# Patient Record
Sex: MALE | Race: WHITE | NOT HISPANIC OR LATINO | Employment: FULL TIME | URBAN - METROPOLITAN AREA
[De-identification: names, ages, dates, MRNs, and addresses within clinical notes are randomized per-mention and may not be internally consistent; named-entity substitution may affect disease eponyms.]

---

## 2017-01-09 ENCOUNTER — CONVERSION ENCOUNTER (OUTPATIENT)
Dept: OTHER | Facility: HOSPITAL | Age: 47
End: 2017-01-09

## 2017-01-17 ENCOUNTER — HOSPITAL ENCOUNTER (OUTPATIENT)
Dept: LAB | Facility: HOSPITAL | Age: 47
Discharge: HOME | End: 2017-01-17
Attending: FAMILY MEDICINE | Admitting: FAMILY MEDICINE

## 2017-01-17 LAB
ALBUMIN SERPL-MCNC: 4.4 G/DL (ref 3.5–4.8)
ALBUMIN/GLOB SERPL: 1.3 {RATIO} (ref 1–1.7)
ALP SERPL-CCNC: 57 IU/L (ref 32–91)
ALT SERPL-CCNC: 39 IU/L (ref 17–63)
ANION GAP SERPL CALC-SCNC: 13.2 MMOL/L (ref 10–20)
AST SERPL-CCNC: 33 IU/L (ref 15–41)
BILIRUB SERPL-MCNC: 0.8 MG/DL (ref 0.3–1.2)
BUN SERPL-MCNC: 16 MG/DL (ref 8–20)
BUN/CREAT SERPL: 17.8 (ref 6.2–20.3)
CALCIUM SERPL-MCNC: 9.6 MG/DL (ref 8.9–10.3)
CHLORIDE SERPL-SCNC: 105 MMOL/L (ref 101–111)
CHOLEST SERPL-MCNC: 174 MG/DL
CHOLEST/HDLC SERPL: 4.5 {RATIO}
CONV CO2: 25 MMOL/L (ref 22–32)
CONV LDL CHOLESTEROL DIRECT: 113 MG/DL (ref 0–100)
CONV TOTAL PROTEIN: 7.8 G/DL (ref 6.1–7.9)
CREAT UR-MCNC: 0.9 MG/DL (ref 0.7–1.2)
GLOBULIN UR ELPH-MCNC: 3.4 G/DL (ref 2.5–3.8)
GLUCOSE SERPL-MCNC: 100 MG/DL (ref 65–99)
HDLC SERPL-MCNC: 39 MG/DL
LDLC/HDLC SERPL: 2.9 {RATIO}
LIPID INTERPRETATION: ABNORMAL
POTASSIUM SERPL-SCNC: 4.2 MMOL/L (ref 3.6–5.1)
SODIUM SERPL-SCNC: 139 MMOL/L (ref 136–144)
TRIGL SERPL-MCNC: 184 MG/DL
VLDLC SERPL CALC-MCNC: 21.9 MG/DL

## 2017-08-29 ENCOUNTER — ON CAMPUS - OUTPATIENT (AMBULATORY)
Dept: URBAN - METROPOLITAN AREA HOSPITAL 2 | Facility: HOSPITAL | Age: 47
End: 2017-08-29
Payer: COMMERCIAL

## 2017-08-29 VITALS
RESPIRATION RATE: 16 BRPM | DIASTOLIC BLOOD PRESSURE: 81 MMHG | HEART RATE: 103 BPM | TEMPERATURE: 98.3 F | DIASTOLIC BLOOD PRESSURE: 99 MMHG | SYSTOLIC BLOOD PRESSURE: 133 MMHG | HEIGHT: 70 IN | HEART RATE: 93 BPM | OXYGEN SATURATION: 98 % | WEIGHT: 248 LBS | SYSTOLIC BLOOD PRESSURE: 136 MMHG | OXYGEN SATURATION: 96 % | SYSTOLIC BLOOD PRESSURE: 109 MMHG | SYSTOLIC BLOOD PRESSURE: 125 MMHG | OXYGEN SATURATION: 97 % | DIASTOLIC BLOOD PRESSURE: 87 MMHG | OXYGEN SATURATION: 95 % | RESPIRATION RATE: 20 BRPM | SYSTOLIC BLOOD PRESSURE: 142 MMHG | HEART RATE: 110 BPM | DIASTOLIC BLOOD PRESSURE: 86 MMHG | DIASTOLIC BLOOD PRESSURE: 79 MMHG | OXYGEN SATURATION: 92 % | DIASTOLIC BLOOD PRESSURE: 64 MMHG | HEART RATE: 107 BPM | HEART RATE: 115 BPM | SYSTOLIC BLOOD PRESSURE: 119 MMHG | SYSTOLIC BLOOD PRESSURE: 150 MMHG | RESPIRATION RATE: 17 BRPM

## 2017-08-29 DIAGNOSIS — Z86.010 PERSONAL HISTORY OF COLONIC POLYPS: ICD-10-CM

## 2017-08-29 DIAGNOSIS — Z80.0 FAMILY HISTORY OF MALIGNANT NEOPLASM OF DIGESTIVE ORGANS: ICD-10-CM

## 2017-08-29 PROCEDURE — 45378 DIAGNOSTIC COLONOSCOPY: CPT

## 2017-08-29 RX ADMIN — PROPOFOL: 10 INJECTION, EMULSION INTRAVENOUS at 11:15

## 2017-09-26 ENCOUNTER — OFFICE VISIT (OUTPATIENT)
Dept: NEUROSURGERY | Facility: CLINIC | Age: 47
End: 2017-09-26

## 2017-09-26 VITALS
WEIGHT: 250 LBS | DIASTOLIC BLOOD PRESSURE: 97 MMHG | SYSTOLIC BLOOD PRESSURE: 147 MMHG | HEART RATE: 81 BPM | HEIGHT: 71 IN | BODY MASS INDEX: 35 KG/M2

## 2017-09-26 DIAGNOSIS — M51.04 HERNIATED NUCLEUS PULPOSUS WITH MYELOPATHY, THORACIC: Primary | ICD-10-CM

## 2017-09-26 PROCEDURE — 99205 OFFICE O/P NEW HI 60 MIN: CPT | Performed by: NEUROLOGICAL SURGERY

## 2017-09-26 RX ORDER — CEFAZOLIN SODIUM 2 G/100ML
2 INJECTION, SOLUTION INTRAVENOUS ONCE
Status: CANCELLED | OUTPATIENT
Start: 2017-10-02 | End: 2017-09-26

## 2017-09-26 RX ORDER — LATANOPROST 50 UG/ML
1 SOLUTION/ DROPS OPHTHALMIC NIGHTLY
COMMUNITY

## 2017-09-26 RX ORDER — ESCITALOPRAM OXALATE 10 MG/1
10 TABLET ORAL NIGHTLY
COMMUNITY

## 2017-09-26 RX ORDER — TRAMADOL HYDROCHLORIDE 50 MG/1
TABLET ORAL
Refills: 1 | COMMUNITY
Start: 2017-08-09 | End: 2017-09-27

## 2017-09-26 RX ORDER — MONTELUKAST SODIUM 10 MG/1
10 TABLET ORAL NIGHTLY
COMMUNITY

## 2017-09-26 NOTE — PATIENT INSTRUCTIONS

## 2017-09-26 NOTE — PROGRESS NOTES
Subjective   Patient ID: Cuba Mosquera is a 47 y.o. male is here today as a self referral for back pain with numbness and tingling in bilateral lower extremity's.    History of Present Illness    This patient has been having trouble with his back and his legs since the end of May.  He initially had a lot of pain in his back and some pain radiating down his right leg but this has gradually gone away.  It has been replaced with weakness and numbness in his right leg.  This has occurred beginning in June and gotten steadily worse.  He feels like his left leg is overall okay.  He has no difficulty with bowel and bladder control or other associated symptoms.  Nothing specific brought the symptoms on or make them better.    The following portions of the patient's history were reviewed and updated as appropriate: allergies, current medications, past family history, past medical history, past social history, past surgical history and problem list.    Review of Systems   Constitutional: Positive for activity change.   Respiratory: Positive for apnea. Negative for chest tightness and shortness of breath.    Cardiovascular: Negative for chest pain.   Musculoskeletal: Positive for back pain and gait problem.        Bilateral leg pain   Neurological: Positive for weakness and numbness.        Tingling in bilateral lower extremity's   All other systems reviewed and are negative.      Objective   Physical Exam   Constitutional: He is oriented to person, place, and time. He appears well-developed and well-nourished.   HENT:   Head: Normocephalic and atraumatic.   Eyes: Conjunctivae and EOM are normal. Pupils are equal, round, and reactive to light.   Fundoscopic exam:       The right eye shows no papilledema. The right eye shows venous pulsations.        The left eye shows no papilledema. The left eye shows venous pulsations.   Neck: Carotid bruit is not present.   Neurological: He is oriented to person, place, and time. He has a  normal Finger-Nose-Finger Test and a normal Heel to Shin Test. Gait normal.   Reflex Scores:       Tricep reflexes are 2+ on the right side and 2+ on the left side.       Bicep reflexes are 2+ on the right side and 2+ on the left side.       Brachioradialis reflexes are 2+ on the right side and 2+ on the left side.       Patellar reflexes are 2+ on the right side and 3+ on the left side.       Achilles reflexes are 2+ on the right side and 3+ on the left side.  Psychiatric: His speech is normal.     Neurologic Exam     Mental Status   Oriented to person, place, and time.   Registration of memory: Good recent and remote memory.   Attention: normal. Concentration: normal.   Speech: speech is normal   Level of consciousness: alert  Knowledge: consistent with education.     Cranial Nerves     CN II   Visual fields full to confrontation.   Visual acuity: normal    CN III, IV, VI   Pupils are equal, round, and reactive to light.  Extraocular motions are normal.     CN V   Facial sensation intact.   Right corneal reflex: normal  Left corneal reflex: normal    CN VII   Facial expression full, symmetric.   Right facial weakness: none  Left facial weakness: none    CN VIII   Hearing: intact    CN IX, X   Palate: symmetric    CN XI   Right sternocleidomastoid strength: normal  Left sternocleidomastoid strength: normal    CN XII   Tongue: not atrophic  Tongue deviation: none    Motor Exam   Muscle bulk: normal  Right arm tone: normal  Left arm tone: normal  Right leg tone: normal  Left leg tone: normal    Strength   Strength 5/5 except as noted.   Right anterior tibial: 4/5  Right posterior tibial: 4/5  Right peroneal: 4/5    Sensory Exam   Light touch normal.   Sensory deficit distribution on right: L3    Gait, Coordination, and Reflexes     Gait  Gait: normal    Coordination   Finger to nose coordination: normal  Heel to shin coordination: normal    Reflexes   Right brachioradialis: 2+  Left brachioradialis: 2+  Right biceps:  2+  Left biceps: 2+  Right triceps: 2+  Left triceps: 2+  Right patellar: 2+  Left patellar: 3+  Right achilles: 2+  Left achilles: 3+  Right : 2+  Left : 2+  Left plantar: upgoing      Assessment/Plan   Independent Review of Radiographic Studies:      I reviewed his MRI of the cervical, thoracic, and lumbar spine.  The MRI of the cervical spine show some disc bulging at several levels and some stenosis but not severe.  The MRI of the thoracic spine shows multilevel disc bulging but by far and away the most severe level is T8-T9 where there is a large left-sided disc herniation compressing the spinal cord and changing the spinal cord signal.  The lumbar spine for the most part looks okay to me.    Medical Decision Making:      I told the patient and his wife about the imaging.  I told them that I see little option at this point but to proceed with a thoracic discectomy at T8-T9.  This will require a transpedicular approach to safely get it out.  I recommended using minimally invasive instrumentation to try and minimize the pain he has postoperatively.  I told him that the surgery would carry a about a 5% chance of causing him paralysis as well as a 2 or 3% chance of infection, bleeding, CSF leak, anesthetic risk and other complications.  The biggest risk is that he does not get very much improvement.  I explain why this is in the face of spinal cord damage.  I also told him about a myelogram but I did not recommend doing one right now as I think it is too risky.  We discussed the postoperative hospital and home course.  He does ask to proceed.    He will need to be scheduled for a: Left T8 9 laminectomy and discectomy with Metrix    Cuba was seen today for back pain and numbness.    Diagnoses and all orders for this visit:    Herniated nucleus pulposus with myelopathy, thoracic  -     Case Request; Standing  -     ceFAZolin (ANCEF) 2 g in sodium chloride 0.9 % 100 mL IVPB; Infuse 2 g into a venous catheter  1 (One) Time.  -     XR Lumbar Spine 2-3 Views; Future  -     XR Spine Thoracic 2 View; Future  -     Case Request    Other orders  -     Follow anesthesia standing orders.  -     Obtain informed consent  -     Follow anesthesia standing orders.; Standing  -     AMOS hose- To be placed on patient in pre-op; Standing  -     SCD (sequential compression device)- to be placed on patient in Pre-op; Standing    Return for After radiology test.

## 2017-09-27 ENCOUNTER — APPOINTMENT (OUTPATIENT)
Dept: PREADMISSION TESTING | Facility: HOSPITAL | Age: 47
End: 2017-09-27

## 2017-09-27 ENCOUNTER — HOSPITAL ENCOUNTER (OUTPATIENT)
Dept: GENERAL RADIOLOGY | Facility: HOSPITAL | Age: 47
Discharge: HOME | End: 2017-09-27
Admitting: NEUROLOGICAL SURGERY

## 2017-09-27 ENCOUNTER — HOSPITAL ENCOUNTER (OUTPATIENT)
Dept: GENERAL RADIOLOGY | Facility: HOSPITAL | Age: 47
Discharge: HOME | End: 2017-09-27

## 2017-09-27 VITALS
SYSTOLIC BLOOD PRESSURE: 152 MMHG | OXYGEN SATURATION: 97 % | DIASTOLIC BLOOD PRESSURE: 90 MMHG | RESPIRATION RATE: 16 BRPM | TEMPERATURE: 97 F | HEART RATE: 94 BPM | WEIGHT: 255 LBS | BODY MASS INDEX: 35.7 KG/M2 | HEIGHT: 71 IN

## 2017-09-27 DIAGNOSIS — M51.04 HERNIATED NUCLEUS PULPOSUS WITH MYELOPATHY, THORACIC: ICD-10-CM

## 2017-09-27 LAB
ANION GAP SERPL CALCULATED.3IONS-SCNC: 13.1 MMOL/L
BUN BLD-MCNC: 13 MG/DL (ref 6–20)
BUN/CREAT SERPL: 12.1 (ref 7–25)
CALCIUM SPEC-SCNC: 9.8 MG/DL (ref 8.6–10.5)
CHLORIDE SERPL-SCNC: 103 MMOL/L (ref 98–107)
CO2 SERPL-SCNC: 24.9 MMOL/L (ref 22–29)
CREAT BLD-MCNC: 1.07 MG/DL (ref 0.76–1.27)
DEPRECATED RDW RBC AUTO: 42.2 FL (ref 37–54)
ERYTHROCYTE [DISTWIDTH] IN BLOOD BY AUTOMATED COUNT: 12.5 % (ref 11.5–14.5)
GFR SERPL CREATININE-BSD FRML MDRD: 74 ML/MIN/1.73
GLUCOSE BLD-MCNC: 119 MG/DL (ref 65–99)
HCT VFR BLD AUTO: 43.8 % (ref 40.4–52.2)
HGB BLD-MCNC: 14.7 G/DL (ref 13.7–17.6)
MCH RBC QN AUTO: 30.9 PG (ref 27–32.7)
MCHC RBC AUTO-ENTMCNC: 33.6 G/DL (ref 32.6–36.4)
MCV RBC AUTO: 92 FL (ref 79.8–96.2)
PLATELET # BLD AUTO: 252 10*3/MM3 (ref 140–500)
PMV BLD AUTO: 10.1 FL (ref 6–12)
POTASSIUM BLD-SCNC: 4 MMOL/L (ref 3.5–5.2)
RBC # BLD AUTO: 4.76 10*6/MM3 (ref 4.6–6)
SODIUM BLD-SCNC: 141 MMOL/L (ref 136–145)
WBC NRBC COR # BLD: 7.25 10*3/MM3 (ref 4.5–10.7)

## 2017-09-27 PROCEDURE — 72100 X-RAY EXAM L-S SPINE 2/3 VWS: CPT

## 2017-09-27 PROCEDURE — 85027 COMPLETE CBC AUTOMATED: CPT | Performed by: NEUROLOGICAL SURGERY

## 2017-09-27 PROCEDURE — 36415 COLL VENOUS BLD VENIPUNCTURE: CPT

## 2017-09-27 PROCEDURE — 80048 BASIC METABOLIC PNL TOTAL CA: CPT | Performed by: NEUROLOGICAL SURGERY

## 2017-09-27 PROCEDURE — 93005 ELECTROCARDIOGRAM TRACING: CPT

## 2017-09-27 PROCEDURE — 72070 X-RAY EXAM THORAC SPINE 2VWS: CPT

## 2017-09-27 PROCEDURE — 93010 ELECTROCARDIOGRAM REPORT: CPT | Performed by: INTERNAL MEDICINE

## 2017-09-27 RX ORDER — TRAMADOL HYDROCHLORIDE 50 MG/1
50 TABLET ORAL EVERY 6 HOURS PRN
COMMUNITY
End: 2017-10-04 | Stop reason: HOSPADM

## 2017-09-27 RX ORDER — LATANOPROST 50 UG/ML
1 SOLUTION/ DROPS OPHTHALMIC NIGHTLY
Status: ON HOLD | COMMUNITY
End: 2017-10-02 | Stop reason: SDUPTHER

## 2017-09-28 ENCOUNTER — HOSPITAL ENCOUNTER (OUTPATIENT)
Dept: GENERAL RADIOLOGY | Facility: HOSPITAL | Age: 47
Discharge: HOME | End: 2017-09-28
Attending: NEUROLOGICAL SURGERY | Admitting: NEUROLOGICAL SURGERY

## 2017-09-28 DIAGNOSIS — Z01.818 PRE-OP EVALUATION: ICD-10-CM

## 2017-09-28 DIAGNOSIS — Z01.818 PRE-OP EVALUATION: Primary | ICD-10-CM

## 2017-09-28 PROCEDURE — 71020 HC CHEST PA AND LATERAL: CPT

## 2017-10-02 ENCOUNTER — HOSPITAL ENCOUNTER (OUTPATIENT)
Facility: HOSPITAL | Age: 47
Setting detail: OBSERVATION
Discharge: HOME | End: 2017-10-04
Attending: NEUROLOGICAL SURGERY | Admitting: NEUROLOGICAL SURGERY

## 2017-10-02 ENCOUNTER — APPOINTMENT (OUTPATIENT)
Dept: GENERAL RADIOLOGY | Facility: HOSPITAL | Age: 47
End: 2017-10-02

## 2017-10-02 ENCOUNTER — ANESTHESIA (OUTPATIENT)
Dept: PERIOP | Facility: HOSPITAL | Age: 47
End: 2017-10-02

## 2017-10-02 ENCOUNTER — ANESTHESIA EVENT (OUTPATIENT)
Dept: PERIOP | Facility: HOSPITAL | Age: 47
End: 2017-10-02

## 2017-10-02 DIAGNOSIS — M51.04 HERNIATED NUCLEUS PULPOSUS WITH MYELOPATHY, THORACIC: ICD-10-CM

## 2017-10-02 DIAGNOSIS — R26.2 DIFFICULTY WALKING: Primary | ICD-10-CM

## 2017-10-02 PROCEDURE — 76000 FLUOROSCOPY <1 HR PHYS/QHP: CPT

## 2017-10-02 PROCEDURE — 25010000003 CEFAZOLIN PER 500 MG: Performed by: NEUROLOGICAL SURGERY

## 2017-10-02 PROCEDURE — G0378 HOSPITAL OBSERVATION PER HR: HCPCS

## 2017-10-02 PROCEDURE — 25010000002 SUCCINYLCHOLINE PER 20 MG: Performed by: ANESTHESIOLOGY

## 2017-10-02 PROCEDURE — 72070 X-RAY EXAM THORAC SPINE 2VWS: CPT

## 2017-10-02 PROCEDURE — 25010000002 FENTANYL CITRATE (PF) 100 MCG/2ML SOLUTION: Performed by: ANESTHESIOLOGY

## 2017-10-02 PROCEDURE — 25010000002 MIDAZOLAM PER 1 MG: Performed by: ANESTHESIOLOGY

## 2017-10-02 PROCEDURE — 94799 UNLISTED PULMONARY SVC/PX: CPT

## 2017-10-02 PROCEDURE — 63055 DECOMPRESS SPINAL CORD THRC: CPT | Performed by: NEUROLOGICAL SURGERY

## 2017-10-02 PROCEDURE — 25810000003 SODIUM CHLORIDE 0.9 % WITH KCL 20 MEQ 20-0.9 MEQ/L-% SOLUTION: Performed by: NEUROLOGICAL SURGERY

## 2017-10-02 PROCEDURE — 25010000003 CEFAZOLIN IN DEXTROSE 2-4 GM/100ML-% SOLUTION: Performed by: NEUROLOGICAL SURGERY

## 2017-10-02 PROCEDURE — 25010000002 PROPOFOL 10 MG/ML EMULSION: Performed by: ANESTHESIOLOGY

## 2017-10-02 PROCEDURE — 25010000002 MORPHINE PER 10 MG: Performed by: NEUROLOGICAL SURGERY

## 2017-10-02 PROCEDURE — 25010000002 HYDROMORPHONE PER 4 MG: Performed by: ANESTHESIOLOGY

## 2017-10-02 PROCEDURE — 25010000002 KETOROLAC TROMETHAMINE PER 15 MG: Performed by: ANESTHESIOLOGY

## 2017-10-02 PROCEDURE — 25010000002 ONDANSETRON PER 1 MG: Performed by: ANESTHESIOLOGY

## 2017-10-02 PROCEDURE — 25010000002 DEXAMETHASONE PER 1 MG: Performed by: ANESTHESIOLOGY

## 2017-10-02 PROCEDURE — 25010000002 NEOSTIGMINE PER 0.5 MG: Performed by: ANESTHESIOLOGY

## 2017-10-02 RX ORDER — MORPHINE SULFATE 2 MG/ML
2 INJECTION, SOLUTION INTRAMUSCULAR; INTRAVENOUS EVERY 4 HOURS PRN
Status: DISCONTINUED | OUTPATIENT
Start: 2017-10-02 | End: 2017-10-03

## 2017-10-02 RX ORDER — NALOXONE HCL 0.4 MG/ML
0.2 VIAL (ML) INJECTION AS NEEDED
Status: DISCONTINUED | OUTPATIENT
Start: 2017-10-02 | End: 2017-10-02 | Stop reason: HOSPADM

## 2017-10-02 RX ORDER — MIDAZOLAM HYDROCHLORIDE 1 MG/ML
1 INJECTION INTRAMUSCULAR; INTRAVENOUS
Status: DISCONTINUED | OUTPATIENT
Start: 2017-10-02 | End: 2017-10-02 | Stop reason: HOSPADM

## 2017-10-02 RX ORDER — SODIUM CHLORIDE 0.9 % (FLUSH) 0.9 %
1-10 SYRINGE (ML) INJECTION AS NEEDED
Status: DISCONTINUED | OUTPATIENT
Start: 2017-10-02 | End: 2017-10-04 | Stop reason: HOSPADM

## 2017-10-02 RX ORDER — EPHEDRINE SULFATE 50 MG/ML
5 INJECTION, SOLUTION INTRAVENOUS ONCE AS NEEDED
Status: DISCONTINUED | OUTPATIENT
Start: 2017-10-02 | End: 2017-10-02 | Stop reason: HOSPADM

## 2017-10-02 RX ORDER — NALOXONE HCL 0.4 MG/ML
0.4 VIAL (ML) INJECTION
Status: DISCONTINUED | OUTPATIENT
Start: 2017-10-02 | End: 2017-10-03

## 2017-10-02 RX ORDER — SODIUM CHLORIDE 0.9 % (FLUSH) 0.9 %
1-10 SYRINGE (ML) INJECTION AS NEEDED
Status: DISCONTINUED | OUTPATIENT
Start: 2017-10-02 | End: 2017-10-02 | Stop reason: HOSPADM

## 2017-10-02 RX ORDER — FLUMAZENIL 0.1 MG/ML
0.2 INJECTION INTRAVENOUS AS NEEDED
Status: DISCONTINUED | OUTPATIENT
Start: 2017-10-02 | End: 2017-10-02 | Stop reason: HOSPADM

## 2017-10-02 RX ORDER — DIPHENHYDRAMINE HYDROCHLORIDE 50 MG/ML
12.5 INJECTION INTRAMUSCULAR; INTRAVENOUS
Status: DISCONTINUED | OUTPATIENT
Start: 2017-10-02 | End: 2017-10-02 | Stop reason: HOSPADM

## 2017-10-02 RX ORDER — ESCITALOPRAM OXALATE 10 MG/1
10 TABLET ORAL NIGHTLY
Status: DISCONTINUED | OUTPATIENT
Start: 2017-10-02 | End: 2017-10-04 | Stop reason: HOSPADM

## 2017-10-02 RX ORDER — WOUND DRESSING ADHESIVE - LIQUID
LIQUID MISCELLANEOUS AS NEEDED
Status: DISCONTINUED | OUTPATIENT
Start: 2017-10-02 | End: 2017-10-02 | Stop reason: HOSPADM

## 2017-10-02 RX ORDER — ONDANSETRON 2 MG/ML
INJECTION INTRAMUSCULAR; INTRAVENOUS AS NEEDED
Status: DISCONTINUED | OUTPATIENT
Start: 2017-10-02 | End: 2017-10-02 | Stop reason: SURG

## 2017-10-02 RX ORDER — SUCCINYLCHOLINE CHLORIDE 20 MG/ML
INJECTION INTRAMUSCULAR; INTRAVENOUS AS NEEDED
Status: DISCONTINUED | OUTPATIENT
Start: 2017-10-02 | End: 2017-10-02 | Stop reason: SURG

## 2017-10-02 RX ORDER — HYDROMORPHONE HYDROCHLORIDE 1 MG/ML
0.5 INJECTION, SOLUTION INTRAMUSCULAR; INTRAVENOUS; SUBCUTANEOUS
Status: DISCONTINUED | OUTPATIENT
Start: 2017-10-02 | End: 2017-10-02 | Stop reason: HOSPADM

## 2017-10-02 RX ORDER — FENTANYL CITRATE 50 UG/ML
INJECTION, SOLUTION INTRAMUSCULAR; INTRAVENOUS AS NEEDED
Status: DISCONTINUED | OUTPATIENT
Start: 2017-10-02 | End: 2017-10-02 | Stop reason: SURG

## 2017-10-02 RX ORDER — PROMETHAZINE HYDROCHLORIDE 25 MG/ML
12.5 INJECTION, SOLUTION INTRAMUSCULAR; INTRAVENOUS ONCE AS NEEDED
Status: DISCONTINUED | OUTPATIENT
Start: 2017-10-02 | End: 2017-10-02 | Stop reason: HOSPADM

## 2017-10-02 RX ORDER — FENTANYL CITRATE 50 UG/ML
50 INJECTION, SOLUTION INTRAMUSCULAR; INTRAVENOUS
Status: DISCONTINUED | OUTPATIENT
Start: 2017-10-02 | End: 2017-10-02 | Stop reason: HOSPADM

## 2017-10-02 RX ORDER — ROCURONIUM BROMIDE 10 MG/ML
INJECTION, SOLUTION INTRAVENOUS AS NEEDED
Status: DISCONTINUED | OUTPATIENT
Start: 2017-10-02 | End: 2017-10-02 | Stop reason: SURG

## 2017-10-02 RX ORDER — LABETALOL HYDROCHLORIDE 5 MG/ML
5 INJECTION, SOLUTION INTRAVENOUS
Status: DISCONTINUED | OUTPATIENT
Start: 2017-10-02 | End: 2017-10-02 | Stop reason: HOSPADM

## 2017-10-02 RX ORDER — HYDROCODONE BITARTRATE AND ACETAMINOPHEN 7.5; 325 MG/1; MG/1
1 TABLET ORAL ONCE AS NEEDED
Status: DISCONTINUED | OUTPATIENT
Start: 2017-10-02 | End: 2017-10-02 | Stop reason: HOSPADM

## 2017-10-02 RX ORDER — LIDOCAINE HYDROCHLORIDE 20 MG/ML
INJECTION, SOLUTION INFILTRATION; PERINEURAL AS NEEDED
Status: DISCONTINUED | OUTPATIENT
Start: 2017-10-02 | End: 2017-10-02 | Stop reason: SURG

## 2017-10-02 RX ORDER — PROMETHAZINE HYDROCHLORIDE 25 MG/1
25 TABLET ORAL ONCE AS NEEDED
Status: DISCONTINUED | OUTPATIENT
Start: 2017-10-02 | End: 2017-10-02 | Stop reason: HOSPADM

## 2017-10-02 RX ORDER — CEFAZOLIN SODIUM 2 G/100ML
2 INJECTION, SOLUTION INTRAVENOUS EVERY 8 HOURS
Status: COMPLETED | OUTPATIENT
Start: 2017-10-02 | End: 2017-10-04

## 2017-10-02 RX ORDER — SODIUM CHLORIDE AND POTASSIUM CHLORIDE 150; 900 MG/100ML; MG/100ML
100 INJECTION, SOLUTION INTRAVENOUS CONTINUOUS
Status: DISCONTINUED | OUTPATIENT
Start: 2017-10-02 | End: 2017-10-03

## 2017-10-02 RX ORDER — KETOROLAC TROMETHAMINE 30 MG/ML
INJECTION, SOLUTION INTRAMUSCULAR; INTRAVENOUS AS NEEDED
Status: DISCONTINUED | OUTPATIENT
Start: 2017-10-02 | End: 2017-10-02 | Stop reason: SURG

## 2017-10-02 RX ORDER — GLYCOPYRROLATE 0.2 MG/ML
INJECTION INTRAMUSCULAR; INTRAVENOUS AS NEEDED
Status: DISCONTINUED | OUTPATIENT
Start: 2017-10-02 | End: 2017-10-02 | Stop reason: SURG

## 2017-10-02 RX ORDER — ONDANSETRON 2 MG/ML
4 INJECTION INTRAMUSCULAR; INTRAVENOUS ONCE AS NEEDED
Status: DISCONTINUED | OUTPATIENT
Start: 2017-10-02 | End: 2017-10-02 | Stop reason: HOSPADM

## 2017-10-02 RX ORDER — HYDRALAZINE HYDROCHLORIDE 20 MG/ML
5 INJECTION INTRAMUSCULAR; INTRAVENOUS
Status: DISCONTINUED | OUTPATIENT
Start: 2017-10-02 | End: 2017-10-02 | Stop reason: HOSPADM

## 2017-10-02 RX ORDER — DEXAMETHASONE SODIUM PHOSPHATE 10 MG/ML
INJECTION INTRAMUSCULAR; INTRAVENOUS AS NEEDED
Status: DISCONTINUED | OUTPATIENT
Start: 2017-10-02 | End: 2017-10-02 | Stop reason: SURG

## 2017-10-02 RX ORDER — SODIUM CHLORIDE, SODIUM LACTATE, POTASSIUM CHLORIDE, CALCIUM CHLORIDE 600; 310; 30; 20 MG/100ML; MG/100ML; MG/100ML; MG/100ML
9 INJECTION, SOLUTION INTRAVENOUS CONTINUOUS
Status: DISCONTINUED | OUTPATIENT
Start: 2017-10-02 | End: 2017-10-02

## 2017-10-02 RX ORDER — PROMETHAZINE HYDROCHLORIDE 25 MG/1
12.5 TABLET ORAL ONCE AS NEEDED
Status: DISCONTINUED | OUTPATIENT
Start: 2017-10-02 | End: 2017-10-02 | Stop reason: HOSPADM

## 2017-10-02 RX ORDER — OXYCODONE AND ACETAMINOPHEN 7.5; 325 MG/1; MG/1
1 TABLET ORAL ONCE AS NEEDED
Status: DISCONTINUED | OUTPATIENT
Start: 2017-10-02 | End: 2017-10-02 | Stop reason: HOSPADM

## 2017-10-02 RX ORDER — FAMOTIDINE 10 MG/ML
20 INJECTION, SOLUTION INTRAVENOUS ONCE
Status: COMPLETED | OUTPATIENT
Start: 2017-10-02 | End: 2017-10-02

## 2017-10-02 RX ORDER — HYDROMORPHONE HCL 110MG/55ML
PATIENT CONTROLLED ANALGESIA SYRINGE INTRAVENOUS AS NEEDED
Status: DISCONTINUED | OUTPATIENT
Start: 2017-10-02 | End: 2017-10-02 | Stop reason: SURG

## 2017-10-02 RX ORDER — ONDANSETRON 4 MG/1
4 TABLET, ORALLY DISINTEGRATING ORAL EVERY 6 HOURS PRN
Status: DISCONTINUED | OUTPATIENT
Start: 2017-10-02 | End: 2017-10-04 | Stop reason: HOSPADM

## 2017-10-02 RX ORDER — ONDANSETRON 4 MG/1
4 TABLET, FILM COATED ORAL EVERY 6 HOURS PRN
Status: DISCONTINUED | OUTPATIENT
Start: 2017-10-02 | End: 2017-10-04 | Stop reason: HOSPADM

## 2017-10-02 RX ORDER — ONDANSETRON 2 MG/ML
4 INJECTION INTRAMUSCULAR; INTRAVENOUS EVERY 6 HOURS PRN
Status: DISCONTINUED | OUTPATIENT
Start: 2017-10-02 | End: 2017-10-04 | Stop reason: HOSPADM

## 2017-10-02 RX ORDER — PROPOFOL 10 MG/ML
VIAL (ML) INTRAVENOUS AS NEEDED
Status: DISCONTINUED | OUTPATIENT
Start: 2017-10-02 | End: 2017-10-02 | Stop reason: SURG

## 2017-10-02 RX ORDER — CEFAZOLIN SODIUM 2 G/100ML
2 INJECTION, SOLUTION INTRAVENOUS ONCE
Status: COMPLETED | OUTPATIENT
Start: 2017-10-02 | End: 2017-10-02

## 2017-10-02 RX ORDER — PROMETHAZINE HYDROCHLORIDE 25 MG/1
25 SUPPOSITORY RECTAL ONCE AS NEEDED
Status: DISCONTINUED | OUTPATIENT
Start: 2017-10-02 | End: 2017-10-02 | Stop reason: HOSPADM

## 2017-10-02 RX ORDER — MIDAZOLAM HYDROCHLORIDE 1 MG/ML
2 INJECTION INTRAMUSCULAR; INTRAVENOUS
Status: DISCONTINUED | OUTPATIENT
Start: 2017-10-02 | End: 2017-10-02 | Stop reason: HOSPADM

## 2017-10-02 RX ORDER — MONTELUKAST SODIUM 10 MG/1
10 TABLET ORAL NIGHTLY
Status: DISCONTINUED | OUTPATIENT
Start: 2017-10-02 | End: 2017-10-04 | Stop reason: HOSPADM

## 2017-10-02 RX ADMIN — FAMOTIDINE 20 MG: 10 INJECTION, SOLUTION INTRAVENOUS at 08:16

## 2017-10-02 RX ADMIN — CEFAZOLIN SODIUM 2 G: 2 INJECTION, SOLUTION INTRAVENOUS at 10:16

## 2017-10-02 RX ADMIN — HYDROMORPHONE HYDROCHLORIDE 0.5 MG: 2 INJECTION, SOLUTION INTRAMUSCULAR; INTRAVENOUS; SUBCUTANEOUS at 11:43

## 2017-10-02 RX ADMIN — NEOSTIGMINE METHYLSULFATE 3 MG: 1 INJECTION INTRAMUSCULAR; INTRAVENOUS; SUBCUTANEOUS at 11:42

## 2017-10-02 RX ADMIN — DEXAMETHASONE SODIUM PHOSPHATE 8 MG: 10 INJECTION INTRAMUSCULAR; INTRAVENOUS at 10:44

## 2017-10-02 RX ADMIN — LIDOCAINE HYDROCHLORIDE 60 MG: 20 INJECTION, SOLUTION INFILTRATION; PERINEURAL at 10:11

## 2017-10-02 RX ADMIN — ROCURONIUM BROMIDE 5 MG: 10 INJECTION INTRAVENOUS at 10:11

## 2017-10-02 RX ADMIN — GLYCOPYRROLATE 0.2 MG: 0.2 INJECTION INTRAMUSCULAR; INTRAVENOUS at 11:02

## 2017-10-02 RX ADMIN — GLYCOPYRROLATE 0.3 MG: 0.2 INJECTION INTRAMUSCULAR; INTRAVENOUS at 11:42

## 2017-10-02 RX ADMIN — SODIUM CHLORIDE, POTASSIUM CHLORIDE, SODIUM LACTATE AND CALCIUM CHLORIDE 9 ML/HR: 600; 310; 30; 20 INJECTION, SOLUTION INTRAVENOUS at 08:04

## 2017-10-02 RX ADMIN — ONDANSETRON 4 MG: 2 INJECTION INTRAMUSCULAR; INTRAVENOUS at 11:36

## 2017-10-02 RX ADMIN — PROPOFOL 200 MG: 10 INJECTION, EMULSION INTRAVENOUS at 10:11

## 2017-10-02 RX ADMIN — ROCURONIUM BROMIDE 25 MG: 10 INJECTION INTRAVENOUS at 10:16

## 2017-10-02 RX ADMIN — Medication 10 ML: at 13:27

## 2017-10-02 RX ADMIN — ESCITALOPRAM 10 MG: 10 TABLET, FILM COATED ORAL at 22:20

## 2017-10-02 RX ADMIN — POTASSIUM CHLORIDE AND SODIUM CHLORIDE 100 ML/HR: 900; 150 INJECTION, SOLUTION INTRAVENOUS at 15:28

## 2017-10-02 RX ADMIN — FENTANYL CITRATE 50 MCG: 50 INJECTION INTRAMUSCULAR; INTRAVENOUS at 10:16

## 2017-10-02 RX ADMIN — KETOROLAC TROMETHAMINE 30 MG: 30 INJECTION, SOLUTION INTRAMUSCULAR; INTRAVENOUS at 11:15

## 2017-10-02 RX ADMIN — FENTANYL CITRATE 50 MCG: 50 INJECTION INTRAMUSCULAR; INTRAVENOUS at 10:11

## 2017-10-02 RX ADMIN — MIDAZOLAM 1 MG: 1 INJECTION INTRAMUSCULAR; INTRAVENOUS at 08:16

## 2017-10-02 RX ADMIN — CEFAZOLIN SODIUM 2 G: 2 INJECTION, SOLUTION INTRAVENOUS at 17:57

## 2017-10-02 RX ADMIN — MONTELUKAST 10 MG: 10 TABLET, FILM COATED ORAL at 22:20

## 2017-10-02 RX ADMIN — MORPHINE SULFATE 2 MG: 2 INJECTION, SOLUTION INTRAMUSCULAR; INTRAVENOUS at 17:57

## 2017-10-02 RX ADMIN — CEFAZOLIN SODIUM 1 G: 2 INJECTION, SOLUTION INTRAVENOUS at 11:38

## 2017-10-02 RX ADMIN — Medication 10 ML: at 17:57

## 2017-10-02 RX ADMIN — FENTANYL CITRATE 50 MCG: 50 INJECTION INTRAMUSCULAR; INTRAVENOUS at 12:22

## 2017-10-02 RX ADMIN — SUCCINYLCHOLINE CHLORIDE 160 MG: 20 INJECTION, SOLUTION INTRAMUSCULAR; INTRAVENOUS; PARENTERAL at 10:11

## 2017-10-02 RX ADMIN — MORPHINE SULFATE 2 MG: 2 INJECTION, SOLUTION INTRAMUSCULAR; INTRAVENOUS at 22:20

## 2017-10-02 RX ADMIN — SODIUM CHLORIDE, POTASSIUM CHLORIDE, SODIUM LACTATE AND CALCIUM CHLORIDE: 600; 310; 30; 20 INJECTION, SOLUTION INTRAVENOUS at 11:30

## 2017-10-02 RX ADMIN — MORPHINE SULFATE 2 MG: 2 INJECTION, SOLUTION INTRAMUSCULAR; INTRAVENOUS at 13:27

## 2017-10-02 NOTE — OP NOTE
Preoperative diagnosis:  Herniated disc T8 9 to the left side    Postoperative diagnosis:  Same    Procedure performed:  Left T8-T9 laminectomy, facetectomy, and transpedicular approach to the left T8 9 disc and discectomy    Surgeon: Yuri Graff M.D.    Asst.:  Marielle Yadav CFA who was instrumental in helping with hemostasis, visualization of neural structures and retraction of neural structures    Estimated blood loss, crystalloid, colloid, blood: Please see anesthesia record    Material to lab:   None    Drains:  One epidural drain    Complications:  None    Indications for the procedure:  This patient presented to the office last week with about a one-month history of becoming more and more myelopathic.  Imaging had shown a fairly large disc herniation with significant cord compression at T8-T9.    Operative summary:  The patient was brought into the operating room and placed under general endotracheal anesthesia using intravenous and inhalational agents.  The patient was then positioned on the operating table in the prone position.  All pressure points were padded including peripheral points of entrapment.  The back was prepped with chloraprep and then draped with Ioban, towels, half sheets and a split sheet.  An incision was made on the left side at the T8-T9 level.  I counted from the lumbar spine up several times just to verify we were at the correct level.  The incision was put a little bit more lateral than normal because of the necessity of going through the pedicle to get to the disc.  Following this successive dilating tubes up to 16 mm x 6 cm were passed over the T8-T9 laminar arch and facet.  Once this was done the inferior laminar arch of T8 superior laminar arch of T9 and the medial aspect of the facet were removed with the [employer] drill.  Following this we're able to open the ligamentum flavum and palpate the T9 pedicle.  The entire top of the T9 pedicle as well as his medial aspect were  removed with the Hel[employer] drill as well as remaining facet.  This gave us good access to the lateral disc without having to mobilize the dura very much.  We were able to essentially elevate the dura off of the disc and see it from the side.  The disc was carefully teased out and was quite large.  Once this was done I attempted to incise the disc space but I was unable to get into the disc space except with the scalpel itself.  Following this I was able to explore under the thecal sac to be sure there was no evidence of residual compression.  A few more fragments of disc were removed and once I was sure that the thecal sac and spinal cord were thoroughly decompressed bleeding was controlled with bipolar cautery thrombin, Gelfoam, and FloSeal.  The tube was then removed and a drain was placed in the epidural space and tunneled subcutaneously.  I elected not to do very much injection on this case because of the risk of causing a pneumothorax.  I injected only about 20 cc into the subcutaneous tissues just around the incision.  Following this the incision was closed in layers dressed and the patient was taken to the recovery room in stable condition there were no apparent complications sponge instrument counts were correct at the end of the procedure.

## 2017-10-02 NOTE — PROGRESS NOTES
Clinical Pharmacy Services: Medication History    Cuba Mosquera is a 47 y.o. male presenting to [facility] for Herniated nucleus pulposus with myelopathy, thoracic [M51.04]  Herniated nucleus pulposus with myelopathy, thoracic [M51.04]    He  has a past medical history of Anxiety; Back pain; Depression; History of shingles; Pupil dilation; Sleep apnea; and Stickler syndrome.    Allergies as of 09/26/2017   • (No Known Allergies)     Medication information was obtained from: patient    Preferred Pharmacy        [facility] [id number] [place] [address], [place] - [address]  AT SEC of [place] [address] [address] - [phone]  - [phone] FX       [address] [facility] [place] [address]       Phone: [phone] Fax: [phone]       Not a 24 hour pharmacy; exact hours not known      Prior to Admission Medications       Prescriptions Last Dose Informant Patient Reported? Taking?      escitalopram (LEXAPRO) 10 MG tablet 10/1/2017 Self Yes Yes    Take 10 mg by mouth Every Night.    latanoprost (XALATAN) 0.005 % ophthalmic solution 10/1/2017 Self Yes Yes    Administer 1 drop to the right eye Every Night.    montelukast (SINGULAIR) 10 MG tablet 10/1/2017 Self Yes Yes    Take 10 mg by mouth Every Night.    traMADol (ULTRAM) 50 MG tablet 10/1/2017 Self Yes Yes    Take 50 mg by mouth Every 6 (Six) Hours As Needed for Moderate Pain .     This medication list is complete to the best of my knowledge as of 10/2/2017    Please call if questions.    Justa Patel, PharmD, BCPS  10/2/2017 2:27 PM

## 2017-10-02 NOTE — ANESTHESIA POSTPROCEDURE EVALUATION
"Patient: Cuba Mosquera    Procedure Summary     Date Anesthesia Start Anesthesia Stop Room / Location    10/02/17 1006 1205  [facility] OR 07 / [facility] MAIN OR       Procedure Diagnosis Surgeon Provider    Left T8 9 laminectomy and discectomy with Metrix (Left Spine Lumbar) Herniated nucleus pulposus with myelopathy, thoracic  (Herniated nucleus pulposus with myelopathy, thoracic [M51.04]) MD Girma Campoverde MD          Anesthesia Type: MAC  Last vitals  BP   128/89 (10/02/17 1235)    Temp   36.6 °C (97.8 °F) (10/02/17 1235)    Pulse   75 (10/02/17 1235)   Resp   14 (10/02/17 1235)    SpO2   96 % (10/02/17 1235)      Post Anesthesia Care and Evaluation    Patient location during evaluation: PACU  Patient participation: complete - patient participated  Level of consciousness: sleepy but conscious  Pain score: 0  Pain management: adequate  Airway patency: patent  Anesthetic complications: No anesthetic complications    Cardiovascular status: acceptable  Respiratory status: acceptable  Hydration status: acceptable    Comments: /89  Pulse 75  Temp 36.6 °C (97.8 °F) (Oral)   Resp 14  Ht 71\" (180.3 cm)  Wt 253 lb 9 oz (115 kg)  SpO2 96%  BMI 35.36 kg/m2        "

## 2017-10-02 NOTE — BRIEF OP NOTE
LUMBAR DISCECTOMY POSTERIOR WITH METRIX  Progress Note    Cuba Mosquera  10/2/2017    Pre-op Diagnosis:   Herniated nucleus pulposus with myelopathy, thoracic [M51.04]    Post-op Diagnosis:     Post-Op Diagnosis Codes:     * Herniated nucleus pulposus with myelopathy, thoracic [M51.04]    Procedure/CPT® Codes:      Procedure(s):  Left T8 9 laminectomy and discectomy with Metrix    Surgeon(s):  Yuri Graff MD    Anesthesia: General    Staff:   Circulator: Chanell Grossman RN  Scrub Person: Bre Singleton  Assistant: Marielle Yadav CSA  Orientee: Veronique Gonzales RN    Estimated Blood Loss: 100ml    Urine Voided: * No values recorded between 10/2/2017 10:05 AM and 10/2/2017 11:44 AM *    Specimens:                None      Drains:   Drain/Device Site 10/02/17 1133 back collapsible closed device (Active)           Findings: large HNP    Complications: none      Yuri Graff MD     Date: 10/2/2017  Time: 11:44 AM

## 2017-10-02 NOTE — PLAN OF CARE
"Problem: Patient Care Overview (Adult)  Goal: Plan of Care Review  Outcome: Ongoing (interventions implemented as appropriate)    10/02/17 1837   Coping/Psychosocial Response Interventions   Plan Of Care Reviewed With patient;spouse   Patient Care Overview   Progress no change   Outcome Evaluation   Outcome Summary/Follow up Plan Arrived from PACU this afternoon, VSS, pain well controlled w/ PRN medications. Ambulating with assist x 1. Numbness and tingling in LE is unchanged from how it felt prior to surgery. Tolerating PO well, diet advanced per patient request. Dressing had scant amount of drainage, marked on arrival- no new drainage noted. Continue to monitor.       Goal: Adult Individualization and Mutuality  Outcome: Ongoing (interventions implemented as appropriate)    10/02/17 1837   Individualization   Patient Specific Preferences to be called \"Max\"       Goal: Discharge Needs Assessment  Outcome: Ongoing (interventions implemented as appropriate)    Problem: Perioperative Period (Adult)  Goal: Signs and Symptoms of Listed Potential Problems Will be Absent or Manageable (Perioperative Period)  Outcome: Ongoing (interventions implemented as appropriate)    10/02/17 1837   Perioperative Period   Problems Assessed (Perioperative Period) all   Problems Present (Perioperative Period) pain         Problem: Pain, Acute (Adult)  Goal: Identify Related Risk Factors and Signs and Symptoms  Outcome: Outcome(s) achieved Date Met:  10/02/17    10/02/17 1837   Pain, Acute   Related Risk Factors (Acute Pain) surgery;positioning;persistent pain   Signs and Symptoms (Acute Pain) BADLs/IADLs reluctance/inability to perform;fatigue/weakness;guarding/abnormal posturing/positioning;verbalization of pain descriptors       Goal: Acceptable Pain Control/Comfort Level  Outcome: Ongoing (interventions implemented as appropriate)    10/02/17 1837   Pain, Acute (Adult)   Acceptable Pain Control/Comfort Level making progress toward " outcome

## 2017-10-02 NOTE — PLAN OF CARE
"Problem: Patient Care Overview (Adult)  Goal: Plan of Care Review  Outcome: Ongoing (interventions implemented as appropriate)    10/02/17 0757   Coping/Psychosocial Response Interventions   Plan Of Care Reviewed With patient   Patient Care Overview   Progress no change       Goal: Adult Individualization and Mutuality  Outcome: Ongoing (interventions implemented as appropriate)    10/02/17 0757   Individualization   Patient Specific Preferences GOES BY M AX   Patient Specific Goals \"GET OUT BETTER THAN I CAME IN\"   Mutuality/Individual Preferences   What Anxieties, Fears or Concerns Do You Have About Your Health or Care? DENIES        Goal: Discharge Needs Assessment  Outcome: Ongoing (interventions implemented as appropriate)    10/02/17 0757   Discharge Needs Assessment   Concerns To Be Addressed denies needs/concerns at this time         Problem: Perioperative Period (Adult)  Goal: Signs and Symptoms of Listed Potential Problems Will be Absent or Manageable (Perioperative Period)  Outcome: Ongoing (interventions implemented as appropriate)    10/02/17 0757   Perioperative Period   Problems Assessed (Perioperative Period) pain;hypoxia/hypoxemia   Problems Present (Perioperative Period) pain  (N/T RIGHT LEG )           "

## 2017-10-02 NOTE — ANESTHESIA PREPROCEDURE EVALUATION
Anesthesia Evaluation     no history of anesthetic complications:         Airway   Mallampati: II  TM distance: >3 FB  Neck ROM: full  possible difficult intubation  Dental - normal exam     Pulmonary    (+) sleep apnea on CPAP,   (-) COPD  Cardiovascular     (-) hypertension, dysrhythmias, angina, hyperlipidemia      Neuro/Psych  GI/Hepatic/Renal/Endo    (-) liver disease, no renal disease, diabetes    Musculoskeletal     (+) back pain,   Abdominal    Substance History      OB/GYN          Other                                        Anesthesia Plan    ASA 3     MAC     intravenous induction   Anesthetic plan and risks discussed with patient.

## 2017-10-02 NOTE — ANESTHESIA PROCEDURE NOTES
Airway  Urgency: elective      General Information and Staff    Patient location during procedure: OR  Anesthesiologist: IBETH XIAO    Indications and Patient Condition    Preoxygenated: yes      Final Airway Details  Final airway type: endotracheal airway      Successful airway: ETT  Cuffed: yes   Successful intubation technique: direct laryngoscopy  Endotracheal tube insertion site: oral  Blade: Alessandro  Blade size: #3  ETT size: 8.0 mm  Cormack-Lehane Classification: grade I - full view of glottis  Placement verified by: chest auscultation   Number of attempts at approach: 1

## 2017-10-03 ENCOUNTER — APPOINTMENT (OUTPATIENT)
Dept: GENERAL RADIOLOGY | Facility: HOSPITAL | Age: 47
End: 2017-10-03
Attending: NEUROLOGICAL SURGERY

## 2017-10-03 LAB
BASOPHILS # BLD AUTO: 0.01 10*3/MM3 (ref 0–0.2)
BASOPHILS NFR BLD AUTO: 0.1 % (ref 0–1.5)
DEPRECATED RDW RBC AUTO: 42.4 FL (ref 37–54)
EOSINOPHIL # BLD AUTO: 0.02 10*3/MM3 (ref 0–0.7)
EOSINOPHIL NFR BLD AUTO: 0.2 % (ref 0.3–6.2)
ERYTHROCYTE [DISTWIDTH] IN BLOOD BY AUTOMATED COUNT: 12.3 % (ref 11.5–14.5)
HCT VFR BLD AUTO: 41.7 % (ref 40.4–52.2)
HGB BLD-MCNC: 13.5 G/DL (ref 13.7–17.6)
IMM GRANULOCYTES # BLD: 0.02 10*3/MM3 (ref 0–0.03)
IMM GRANULOCYTES NFR BLD: 0.2 % (ref 0–0.5)
LYMPHOCYTES # BLD AUTO: 1.96 10*3/MM3 (ref 0.9–4.8)
LYMPHOCYTES NFR BLD AUTO: 18.1 % (ref 19.6–45.3)
MCH RBC QN AUTO: 30.8 PG (ref 27–32.7)
MCHC RBC AUTO-ENTMCNC: 32.4 G/DL (ref 32.6–36.4)
MCV RBC AUTO: 95.2 FL (ref 79.8–96.2)
MONOCYTES # BLD AUTO: 0.84 10*3/MM3 (ref 0.2–1.2)
MONOCYTES NFR BLD AUTO: 7.8 % (ref 5–12)
NEUTROPHILS # BLD AUTO: 7.98 10*3/MM3 (ref 1.9–8.1)
NEUTROPHILS NFR BLD AUTO: 73.6 % (ref 42.7–76)
PLATELET # BLD AUTO: 224 10*3/MM3 (ref 140–500)
PMV BLD AUTO: 10.2 FL (ref 6–12)
RBC # BLD AUTO: 4.38 10*6/MM3 (ref 4.6–6)
WBC NRBC COR # BLD: 10.83 10*3/MM3 (ref 4.5–10.7)

## 2017-10-03 PROCEDURE — G8978 MOBILITY CURRENT STATUS: HCPCS

## 2017-10-03 PROCEDURE — 97161 PT EVAL LOW COMPLEX 20 MIN: CPT

## 2017-10-03 PROCEDURE — 97110 THERAPEUTIC EXERCISES: CPT

## 2017-10-03 PROCEDURE — G0378 HOSPITAL OBSERVATION PER HR: HCPCS

## 2017-10-03 PROCEDURE — 25810000003 SODIUM CHLORIDE 0.9 % WITH KCL 20 MEQ 20-0.9 MEQ/L-% SOLUTION: Performed by: NEUROLOGICAL SURGERY

## 2017-10-03 PROCEDURE — 85025 COMPLETE CBC W/AUTO DIFF WBC: CPT | Performed by: NEUROLOGICAL SURGERY

## 2017-10-03 PROCEDURE — 72070 X-RAY EXAM THORAC SPINE 2VWS: CPT

## 2017-10-03 PROCEDURE — 25010000002 MORPHINE PER 10 MG: Performed by: NEUROLOGICAL SURGERY

## 2017-10-03 PROCEDURE — 99024 POSTOP FOLLOW-UP VISIT: CPT | Performed by: NURSE PRACTITIONER

## 2017-10-03 PROCEDURE — 94799 UNLISTED PULMONARY SVC/PX: CPT

## 2017-10-03 PROCEDURE — G8979 MOBILITY GOAL STATUS: HCPCS

## 2017-10-03 PROCEDURE — 25010000003 CEFAZOLIN IN DEXTROSE 2-4 GM/100ML-% SOLUTION: Performed by: NEUROLOGICAL SURGERY

## 2017-10-03 RX ORDER — HYDROCODONE BITARTRATE AND ACETAMINOPHEN 5; 325 MG/1; MG/1
1 TABLET ORAL EVERY 4 HOURS PRN
Status: DISCONTINUED | OUTPATIENT
Start: 2017-10-03 | End: 2017-10-04 | Stop reason: HOSPADM

## 2017-10-03 RX ORDER — BACLOFEN 10 MG/1
5 TABLET ORAL EVERY 6 HOURS SCHEDULED
Status: DISCONTINUED | OUTPATIENT
Start: 2017-10-03 | End: 2017-10-04 | Stop reason: HOSPADM

## 2017-10-03 RX ORDER — BISACODYL 10 MG
10 SUPPOSITORY, RECTAL RECTAL DAILY
Status: DISCONTINUED | OUTPATIENT
Start: 2017-10-03 | End: 2017-10-04 | Stop reason: HOSPADM

## 2017-10-03 RX ORDER — DOCUSATE SODIUM 100 MG/1
200 CAPSULE, LIQUID FILLED ORAL 2 TIMES DAILY
Status: DISCONTINUED | OUTPATIENT
Start: 2017-10-03 | End: 2017-10-04 | Stop reason: HOSPADM

## 2017-10-03 RX ORDER — POLYETHYLENE GLYCOL 3350 17 G/17G
17 POWDER, FOR SOLUTION ORAL DAILY
Status: DISCONTINUED | OUTPATIENT
Start: 2017-10-03 | End: 2017-10-04 | Stop reason: HOSPADM

## 2017-10-03 RX ADMIN — MONTELUKAST 10 MG: 10 TABLET, FILM COATED ORAL at 21:08

## 2017-10-03 RX ADMIN — POTASSIUM CHLORIDE AND SODIUM CHLORIDE 100 ML/HR: 900; 150 INJECTION, SOLUTION INTRAVENOUS at 02:28

## 2017-10-03 RX ADMIN — ESCITALOPRAM 10 MG: 10 TABLET, FILM COATED ORAL at 21:08

## 2017-10-03 RX ADMIN — Medication 10 ML: at 17:39

## 2017-10-03 RX ADMIN — POLYETHYLENE GLYCOL 3350 17 G: 17 POWDER, FOR SOLUTION ORAL at 14:02

## 2017-10-03 RX ADMIN — HYDROCODONE BITARTRATE AND ACETAMINOPHEN 1 TABLET: 5; 325 TABLET ORAL at 21:09

## 2017-10-03 RX ADMIN — CEFAZOLIN SODIUM 2 G: 2 INJECTION, SOLUTION INTRAVENOUS at 09:47

## 2017-10-03 RX ADMIN — BACLOFEN 5 MG: 10 TABLET ORAL at 15:03

## 2017-10-03 RX ADMIN — MORPHINE SULFATE 2 MG: 2 INJECTION, SOLUTION INTRAMUSCULAR; INTRAVENOUS at 09:54

## 2017-10-03 RX ADMIN — Medication 10 ML: at 09:47

## 2017-10-03 RX ADMIN — MORPHINE SULFATE 2 MG: 2 INJECTION, SOLUTION INTRAMUSCULAR; INTRAVENOUS at 02:30

## 2017-10-03 RX ADMIN — BACLOFEN 5 MG: 10 TABLET ORAL at 17:38

## 2017-10-03 RX ADMIN — BISACODYL 10 MG: 10 SUPPOSITORY RECTAL at 23:53

## 2017-10-03 RX ADMIN — HYDROCODONE BITARTRATE AND ACETAMINOPHEN 1 TABLET: 5; 325 TABLET ORAL at 18:08

## 2017-10-03 RX ADMIN — Medication 10 ML: at 18:08

## 2017-10-03 RX ADMIN — CEFAZOLIN SODIUM 2 G: 2 INJECTION, SOLUTION INTRAVENOUS at 02:28

## 2017-10-03 RX ADMIN — HYDROCODONE BITARTRATE AND ACETAMINOPHEN 1 TABLET: 5; 325 TABLET ORAL at 14:02

## 2017-10-03 RX ADMIN — BACLOFEN 5 MG: 10 TABLET ORAL at 23:53

## 2017-10-03 RX ADMIN — CEFAZOLIN SODIUM 2 G: 2 INJECTION, SOLUTION INTRAVENOUS at 17:38

## 2017-10-03 RX ADMIN — Medication 10 ML: at 09:54

## 2017-10-03 RX ADMIN — DOCUSATE SODIUM 200 MG: 100 CAPSULE, LIQUID FILLED ORAL at 14:02

## 2017-10-03 NOTE — PLAN OF CARE
Problem: Patient Care Overview (Adult)  Goal: Plan of Care Review  Outcome: Ongoing (interventions implemented as appropriate)    10/03/17 0543   Coping/Psychosocial Response Interventions   Plan Of Care Reviewed With patient   Patient Care Overview   Progress no change   Outcome Evaluation   Outcome Summary/Follow up Plan Pt A&O x4. Pain well controlled. Able to ambulate in [place] with 1 assist.          Problem: Perioperative Period (Adult)  Goal: Signs and Symptoms of Listed Potential Problems Will be Absent or Manageable (Perioperative Period)  Outcome: Ongoing (interventions implemented as appropriate)    Problem: Pain, Acute (Adult)  Goal: Acceptable Pain Control/Comfort Level  Outcome: Ongoing (interventions implemented as appropriate)

## 2017-10-03 NOTE — PLAN OF CARE
Problem: Patient Care Overview (Adult)  Goal: Plan of Care Review    10/03/17 1422   Coping/Psychosocial Response Interventions   Plan Of Care Reviewed With patient   Outcome Evaluation   Outcome Summary/Follow up Plan Pt. will benefit from skilled inpt. P.T. to address his functional deficits and to assist pt. in regaining his independence with functional mobility.         Problem: Inpatient Physical Therapy  Goal: Bed Mobility Goal LTG- PT    10/03/17 1422   Bed Mobility PT LTG   Bed Mobility PT LTG, Date Established 10/03/17   Bed Mobility PT LTG, Time to Achieve 2 - 3 days   Bed Mobility PT LTG, Activity Type all bed mobility   Bed Mobility PT LTG, [place] Level independent   Bed Mobility PT LTG, Additional Goal via logroll       Goal: Transfer Training Goal 1 LTG- PT    10/03/17 1422   Transfer Training PT LTG   Transfer Training PT LTG, Date Established 10/03/17   Transfer Training PT LTG, Time to Achieve 2 - 3 days   Transfer Training PT LTG, Activity Type all transfers   Transfer Training PT LTG, [place] Level independent   Transfer Training PT LTG, Additional Goal With or without AAD       Goal: Gait Training Goal LTG- PT    10/03/17 1422   Gait Training PT LTG   Gait Training Goal PT LTG, Date Established 10/03/17   Gait Training Goal PT LTG, Time to Achieve 2 - 3 days   Gait Training Goal PT LTG, [place] Level independent   Gait Training Goal PT LTG, Distance to Achieve 400 feet   Gait Training Goal PT LTG, Additional Goal With or without A.A.D.

## 2017-10-03 NOTE — THERAPY EVALUATION
Acute Care - Physical Therapy Initial Evaluation  [facility]     Patient Name: Cuba Mosquera  : 1970  MRN: [id number]  Today's Date: 10/3/2017   Onset of Illness/Injury or Date of Surgery Date: 10/02/17  Date of Referral to PT: 10/03/17  Referring Physician: Chelsie Mullen      Admit Date: 10/2/2017     Visit Dx:    ICD-10-CM ICD-9-CM   1. Difficulty walking R26.2 719.7   2. Herniated nucleus pulposus with myelopathy, thoracic M51.04 722.72     Patient Active Problem List   Diagnosis   • Herniated nucleus pulposus with myelopathy, thoracic     Past Medical History:   Diagnosis Date   • Anxiety    • Back pain    • Depression    • History of shingles        • Pupil dilation     LEFT EYE, DUE TO RETINA SURGERY   • Sleep apnea    • Stickler syndrome      Past Surgical History:   Procedure Laterality Date   • APPENDECTOMY     • LUMBAR DISCECTOMY FUSION INSTRUMENTATION Left 10/2/2017    Procedure: Left T8 9 laminectomy and discectomy with Metrix;  Surgeon: Yrui Graff MD;  Location: [facility] OR;  Service:    • RETINAL DETACHMENT REPAIR      X2          PT ASSESSMENT (last 72 hours)      PT Evaluation       10/03/17 1413 10/02/17 0754    Rehab Evaluation    Document Type evaluation  -MS     Subjective Information agree to therapy;complains of;pain  -MS     Patient Effort, Rehab Treatment good  -MS     Symptoms Noted Comment Pt. reports pain/discomfort in his back from surgery this PM.  Pt. reports no pain into his BLE's.  -MS     General Information    Onset of Illness/Injury or Date of Surgery Date 10/02/17  -MS     Referring Physician Chelsie Mullen  -MS     Pertinent History Of Current Problem Pt. s/p Left T8-T9 Laminectomy, facetectomy (10/3/17)  -MS     Precautions/Limitations fall precautions   Back precautions  -MS     Prior Level of Function independent:  -MS     Equipment Currently Used at Home cane, straight  -MS cane, straight;bipap/ cpap  -AM    Plans/Goals Discussed With  patient;agreed upon  -MS     Risks Reviewed patient:  -MS     Benefits Reviewed patient:  -MS     Barriers to Rehab none identified  -MS     Living Environment    Lives With  spouse  -AM    Living Arrangements  house  -AM    Home Accessibility  stairs to enter home;stairs within home;bed and bath on same level;no concerns  -AM    Number of Stairs to Enter Home  2  -AM    Number of Stairs Within Home  --   UPSTAIRS AND BASEMENT   -AM    Stair Railings at Home  inside, present at both sides  -AM    Type of Financial/Environmental Concern  none  -AM    Transportation Available  car;family or friend will provide  -AM    Clinical Impression    Date of Referral to PT 10/03/17  -MS     Criteria for Skilled Therapeutic Interventions Met treatment indicated  -MS     Rehab Potential good, to achieve stated therapy goals  -MS     Pain Assessment    Pain Assessment 0-10  -MS     Pain Score 3  -MS     Post Pain Score 3  -MS     Pain Type Acute pain;Surgical pain  -MS     Pain Location Back  -MS     Cognitive Assessment/Intervention    Current Cognitive/Communication Assessment functional  -MS     Orientation Status oriented x 4  -MS     Follows Commands/Answers Questions 100% of the time  -MS     Personal Safety WNL/WFL  -MS     Personal Safety Interventions fall prevention program maintained;gait belt;nonskid shoes/slippers when out of bed;supervised activity  -MS     ROM (Range of Motion)    General ROM no range of motion deficits identified  -MS     MMT (Manual Muscle Testing)    General MMT Assessment --   BUE/LE MMT (4-/5)  -MS     Bed Mobility, Assessment/Treatment    Bed Mobility, Comment Pt. up in chair this PM.  -MS     Transfer Assessment/Treatment    Transfers, Sit-Stand Cobb contact guard assist  -MS     Transfers, Stand-Sit [place] contact guard assist  -MS     Transfers, Sit-Stand-Sit, Assist Device straight cane  -MS     Gait Assessment/Treatment    Gait, [place] Level contact guard assist  -MS      Gait, Assistive Device straight cane  -MS     Gait, Distance (Feet) 200  -MS     Gait, Gait Pattern Analysis swing-through gait  -MS     Gait, Gait Deviations antalgic;john paul decreased   wide base of support  -MS     Gait, Safety Issues balance decreased during turns  -MS     Gait, Comment Guarded in ambulation with occ. imbalance.  Pt. prefers to use straight cane with ambulation.  -MS     Stairs Assessment/Treatment    Number of Stairs 12  -MS     Stairs, Handrail Location left side (ascending)  -MS     Stairs, [place] Level contact guard assist  -MS     Therapy Exercises    Bilateral Lower Extremities AROM:;sitting;ankle pumps/circles;hip flexion;LAQ   Encouraged pt. to continue ther. ex. on his own.  -MS     Positioning and Restraints    Pre-Treatment Position --   Up ambulating with nursing staff  -MS     Post Treatment Position chair  -MS     In Chair notified nsg;sitting;call light within reach;encouraged to call for assist;with nsg  -MS       User Key  (r) = Recorded By, (t) = Taken By, (c) = Cosigned By    Initials Name Provider Type    AM Cassy Archuleta, RN Registered Nurse    MS Ovidio Meza, PT Physical Therapist          Physical Therapy Education     Title: PT OT SLP Therapies (Done)     Topic: Physical Therapy (Done)     Point: Mobility training (Done)    Learning Progress Summary    Learner Readiness Method Response Comment Documented by Status   Patient Acceptance STACEY MUSTAFA NR  MS 10/03/17 1422 Done               Point: Home exercise program (Done)    Learning Progress Summary    Learner Readiness Method Response Comment Documented by Status   Patient Acceptance STACEY MUSTAFA NR  MS 10/03/17 1422 Done               Point: Body mechanics (Done)    Learning Progress Summary    Learner Readiness Method Response Comment Documented by Status   Patient Acceptance STACEY MUSTAFA NR  MS 10/03/17 1422 Done               Point: Precautions (Done)    Learning Progress Summary    Learner Readiness Method Response  Comment Documented by Status   Patient Acceptance E,D BRENNAN,NR  MS 10/03/17 1422 Done                      User Key     Initials Effective Dates Name Provider Type Discipline    MS 12/01/15 -  Ovidio Meza, PT Physical Therapist PT                PT Recommendation and Plan  Anticipated Equipment Needs At Discharge:  (Pt. already owns a straight cane for ambulation)  Anticipated Discharge Disposition: home with assist, home with outpatient services  Planned Therapy Interventions: balance training, bed mobility training, gait training, home exercise program, patient/family education, postural re-education, strengthening, transfer training  PT Frequency: daily  Plan of Care Review  Plan Of Care Reviewed With: patient  Outcome Summary/Follow up Plan: Pt. will benefit from skilled inpt. P.T. to address his functional deficits and to assist pt. in regaining his independence with functional mobility.          IP PT Goals       10/03/17 1422          Bed Mobility PT LTG    Bed Mobility PT LTG, Date Established 10/03/17  -MS      Bed Mobility PT LTG, Time to Achieve 2 - 3 days  -MS      Bed Mobility PT LTG, Activity Type all bed mobility  -MS      Bed Mobility PT LTG, [place] Level independent  -MS      Bed Mobility PT LTG, Additional Goal via logroll  -MS      Transfer Training PT LTG    Transfer Training PT LTG, Date Established 10/03/17  -MS      Transfer Training PT LTG, Time to Achieve 2 - 3 days  -MS      Transfer Training PT LTG, Activity Type all transfers  -MS      Transfer Training PT LTG, [place] Level independent  -MS      Transfer Training PT LTG, Additional Goal With or without AAD  -MS      Gait Training PT LTG    Gait Training Goal PT LTG, Date Established 10/03/17  -MS      Gait Training Goal PT LTG, Time to Achieve 2 - 3 days  -MS      Gait Training Goal PT LTG, [place] Level independent  -MS      Gait Training Goal PT LTG, Distance to Achieve 400 feet  -MS      Gait Training Goal PT LTG,  Additional Goal With or without A.A.D.  -MS        User Key  (r) = Recorded By, (t) = Taken By, (c) = Cosigned By    Initials Name Provider Type    MS Ovidio VIZCAINO Susana PT Physical Therapist                Outcome Measures       10/03/17 1400          How much help from another person do you currently need...    Turning from your back to your side while in flat bed without using bedrails? 4  -MS      Moving from lying on back to sitting on the side of a flat bed without bedrails? 3  -MS      Moving to and from a bed to a chair (including a wheelchair)? 3  -MS      Standing up from a chair using your arms (e.g., wheelchair, bedside chair)? 3  -MS      Climbing 3-5 steps with a railing? 3  -MS      To walk in hospital room? 3  -MS      AM-PAC 6 Clicks Score 19  -MS      Functional Assessment    Outcome Measure Options AM-PAC 6 Clicks Basic Mobility (PT)  -MS        User Key  (r) = Recorded By, (t) = Taken By, (c) = Cosigned By    Initials Name Provider Type    MS Ovidio VIZCAINO Susana PT Physical Therapist           Time Calculation:         PT Charges       10/03/17 1424          Time Calculation    Start Time 1351  -MS      Stop Time 1405  -MS      Time Calculation (min) 14 min  -MS      PT Received On 10/03/17  -MS      PT - Next Appointment 10/04/17  -MS      PT Goal Re-Cert Due Date 10/06/17  -MS        User Key  (r) = Recorded By, (t) = Taken By, (c) = Cosigned By    Initials Name Provider Type    MS Ovidio VIZCAINO RHONDA Meza Physical Therapist          Therapy Charges for Today     Code Description Service Date Service Provider Modifiers Qty    50792507596 HC PT MOBILITY CURRENT 10/3/2017 Ovidio Meza, PT GP, CI 1    50219762504 HC PT MOBILITY PROJECTED 10/3/2017 Ovidio Meza, PT GP, CH 1    08727038829 HC PT EVAL LOW COMPLEXITY 2 10/3/2017 Ovidio Meza, PT GP 1    47994917630 HC PT THER PROC EA 15 MIN 10/3/2017 Ovidio Meza PT GP 1          PT G-Codes  PT Professional Judgement Used?: Yes  Outcome  Measure Options: AM-PAC 6 Clicks Basic Mobility (PT)  Functional Limitation: Mobility: Walking and moving around  Mobility: Walking and Moving Around Current Status (): At least 1 percent but less than 20 percent impaired, limited or restricted  Mobility: Walking and Moving Around Goal Status (): 0 percent impaired, limited or restricted      Ovidio Meza, PT  10/3/2017

## 2017-10-03 NOTE — PROGRESS NOTES
"   LOS: 0 days   Patient Care Team:  Jona Ribeiro MD as PCP - General (Family Medicine)    Chief Complaint:     Post op visit  Back pain  Leg weakness from thoracic myelopathy    Subjective     HPI Comments: Patient is doing well.  He still has difficulty with balance and walking.  We will order physical therapy.  He is voiding without difficulty.  He has been getting IV morphine when necessary for pain.  He would like to start on oral pain medications which I will start today.    Back Pain   The pain is present in the lumbar spine. The quality of the pain is described as aching. The pain does not radiate (No leg pain ). The symptoms are aggravated by position. Associated symptoms include weakness (thoracic myelopathy). Pertinent negatives include no abdominal pain, bladder incontinence, bowel incontinence, chest pain or fever. (C/O constipation) He has tried analgesics (Will start oral pain meds; stop Morphine) for the symptoms.       Subjective:  Symptoms:  Stable.  He reports weakness (thoracic myelopathy).  No shortness of breath or chest pain.    Diet:  Adequate intake.  No nausea or vomiting.    Activity level: Impaired due to weakness.    Pain:  He complains of pain that is moderate.        History taken from: patient chart    Objective     Vital Signs  Temp:  [97.7 °F (36.5 °C)-98.1 °F (36.7 °C)] 97.8 °F (36.6 °C)  Heart Rate:  [66-88] 84  Resp:  [14-18] 18  BP: (107-155)/(71-96) 139/76    Objective:  General Appearance:  Comfortable.    Vital signs: (most recent): Blood pressure 139/76, pulse 84, temperature 97.8 °F (36.6 °C), temperature source Oral, resp. rate 18, height 71\" (180.3 cm), weight 253 lb 9 oz (115 kg), SpO2 96 %.  Vital signs are normal.  No fever.    Output: Producing urine.    Lungs:  Normal effort.    Heart: Normal rate.    Abdomen: Abdomen is soft and distended.    Extremities: Normal range of motion.  (Having a lot of tightness in his low back (from surgery) and right leg (likely " related to myelopathy).)  Neurological: Patient is alert.  GCS score is 15.    Skin:  Warm and dry.  (Thoracic incision is okay.  MAR drain intact.  Dark bloody drainage.)        Results Review:     I reviewed the patient's new clinical results.     Results for LISA HARVEY (MRN [id number]) as of 10/3/2017 11:11   Ref. Range 10/3/2017 05:48   WBC Latest Ref Range: 4.50 - 10.70 10*3/mm3 10.83 (H)   Results for LISA HARVEY (MRN [id number]) as of 10/3/2017 13:09   Ref. Range 10/3/2017 05:48   Hemoglobin Latest Ref Range: 13.7 - 17.6 g/dL 13.5 (L)   Hematocrit Latest Ref Range: 40.4 - 52.2 % 41.7       Thoracic Spine Xray - no acute process; alignment is normal.     MAR drain - last documented output was 50 cc at 2400.     Medication Review:     Add oral pain meds; stop IV morphine  Add baclofen    Assessment/Plan     Active Problems:    Herniated nucleus pulposus with myelopathy, thoracic      Assessment & Plan     POD 1 left T8-9 lami/discectomy  Thoracic myelopathy; mild spasticity  Constipation - reports no BM in 3 days    Add oral baclofen  Continue to mobilize; consult PT  Consult CCP  CBC in am  Work on bowels  Keep drain    Chelsie Warren, AURELIO  10/03/17  11:10 AM

## 2017-10-03 NOTE — PLAN OF CARE
Problem: Patient Care Overview (Adult)  Goal: Plan of Care Review  Outcome: Ongoing (interventions implemented as appropriate)    10/03/17 1802   Coping/Psychosocial Response Interventions   Plan Of Care Reviewed With patient;spouse   Patient Care Overview   Progress improving   Outcome Evaluation   Outcome Summary/Follow up Plan VSS, pain well controlled w/ PRN medications. Ambulating every 2 hours around entire unit w/ standby assist and use of cane. Tolerating well. Adequate PO- IVF D/C'd. See charting for MAR output. Continue to monitor.        Goal: Adult Individualization and Mutuality  Outcome: Ongoing (interventions implemented as appropriate)  Goal: Discharge Needs Assessment  Outcome: Ongoing (interventions implemented as appropriate)    Problem: Perioperative Period (Adult)  Goal: Signs and Symptoms of Listed Potential Problems Will be Absent or Manageable (Perioperative Period)  Outcome: Ongoing (interventions implemented as appropriate)    10/03/17 1802   Perioperative Period   Problems Assessed (Perioperative Period) all   Problems Present (Perioperative Period) pain         Problem: Pain, Acute (Adult)  Goal: Acceptable Pain Control/Comfort Level  Outcome: Ongoing (interventions implemented as appropriate)    10/03/17 1802   Pain, Acute (Adult)   Acceptable Pain Control/Comfort Level making progress toward outcome

## 2017-10-03 NOTE — PROGRESS NOTES
Discharge Planning Assessment  [facility]     Patient Name: Cuba Mosquera  MRN: [id number]  Today's Date: 10/3/2017    Admit Date: 10/2/2017          Discharge Needs Assessment       10/03/17 1424    Living Environment    Lives With spouse    Living Arrangements house    Home Accessibility bed and bath on same level;stairs to enter home;stairs within home    Number of Stairs to Enter Home 2    Stair Railings at Home inside, present at both sides    Transportation Available car;family or friend will provide    Discharge Needs Assessment    Concerns To Be Addressed no discharge needs identified;denies needs/concerns at this time    Equipment Currently Used at Home cane, straight    Equipment Needed After Discharge none    Current Discharge Risk physical impairment    Discharge Disposition home or self-care    Discharge Planning Comments Patient plans to rehab in outpatient setting: [facility] in [place]            Discharge Plan       10/03/17 1425    Case Management/Social Work Plan    Plan Home w/ spouse; outpt PT    Patient/Family In Agreement With Plan yes    Additional Comments Met w/ patient in room.  Introduced self and explained role.  Facesheet verified.  Options for rehab reviewed.  Pt prefers to d/c home and continue outpt PT at [facility] in [place].          Discharge Placement     No information found        Expected Discharge Date and Time     Expected Discharge Date Expected Discharge Time    Oct 4, 2017               Demographic Summary       10/03/17 1423    Referral Information    Admission Type observation    Arrived From still a patient    Referral Source hospital clinician/department (specify)    Reason For Consult care coordination/care conference;discharge planning    Contact Information    Permission Granted to Share Information With family/designee            Functional Status       10/03/17 1423    Functional Status Current    Ambulation 3-->assistive  equipment and person    Transferring 3-->assistive equipment and person    Toileting 2-->assistive person    Bathing 2-->assistive person    Dressing 2-->assistive person    Eating 0-->independent    Communication 0-->understands/communicates without difficulty    Swallowing (if score 2 or more for any item, consult Rehab Services) 0-->swallows foods/liquids without difficulty    Functional Status Prior    Ambulation 1-->assistive equipment    Transferring 0-->independent    Toileting 0-->independent    Bathing 0-->independent    Dressing 0-->independent    Eating 0-->independent    Communication 0-->understands/communicates without difficulty    Swallowing 0-->swallows foods/liquids without difficulty    Cognitive/Perceptual/Developmental    Current Mental Status/Cognitive Functioning no deficits noted            Psychosocial     None            Abuse/Neglect     None            Legal     None            Substance Abuse     None            Patient Forms       10/03/17 1425    Patient Forms    Provider Choice List Delivered    Delivered to Patient    Method of delivery In person          Renaldo Cheng RN

## 2017-10-04 VITALS
HEART RATE: 80 BPM | HEIGHT: 71 IN | OXYGEN SATURATION: 96 % | RESPIRATION RATE: 18 BRPM | TEMPERATURE: 97.6 F | SYSTOLIC BLOOD PRESSURE: 142 MMHG | WEIGHT: 253.56 LBS | BODY MASS INDEX: 35.5 KG/M2 | DIASTOLIC BLOOD PRESSURE: 86 MMHG

## 2017-10-04 PROBLEM — M51.04 HERNIATED NUCLEUS PULPOSUS WITH MYELOPATHY, THORACIC: Status: RESOLVED | Noted: 2017-09-26 | Resolved: 2017-10-04

## 2017-10-04 LAB
DEPRECATED RDW RBC AUTO: 43.4 FL (ref 37–54)
ERYTHROCYTE [DISTWIDTH] IN BLOOD BY AUTOMATED COUNT: 12.5 % (ref 11.5–14.5)
HCT VFR BLD AUTO: 39.7 % (ref 40.4–52.2)
HGB BLD-MCNC: 12.9 G/DL (ref 13.7–17.6)
MCH RBC QN AUTO: 30.9 PG (ref 27–32.7)
MCHC RBC AUTO-ENTMCNC: 32.5 G/DL (ref 32.6–36.4)
MCV RBC AUTO: 95 FL (ref 79.8–96.2)
PLATELET # BLD AUTO: 205 10*3/MM3 (ref 140–500)
PMV BLD AUTO: 10.3 FL (ref 6–12)
RBC # BLD AUTO: 4.18 10*6/MM3 (ref 4.6–6)
WBC NRBC COR # BLD: 6.7 10*3/MM3 (ref 4.5–10.7)

## 2017-10-04 PROCEDURE — 99024 POSTOP FOLLOW-UP VISIT: CPT | Performed by: PHYSICIAN ASSISTANT

## 2017-10-04 PROCEDURE — 25010000003 CEFAZOLIN IN DEXTROSE 2-4 GM/100ML-% SOLUTION: Performed by: NEUROLOGICAL SURGERY

## 2017-10-04 PROCEDURE — 85027 COMPLETE CBC AUTOMATED: CPT | Performed by: NURSE PRACTITIONER

## 2017-10-04 PROCEDURE — G0378 HOSPITAL OBSERVATION PER HR: HCPCS

## 2017-10-04 PROCEDURE — 97110 THERAPEUTIC EXERCISES: CPT

## 2017-10-04 RX ORDER — CEPHALEXIN 500 MG/1
500 CAPSULE ORAL 2 TIMES DAILY
Qty: 20 CAPSULE | Refills: 0 | COMMUNITY
Start: 2017-10-04 | End: 2017-10-23

## 2017-10-04 RX ORDER — BACLOFEN 10 MG/1
5 TABLET ORAL EVERY 6 HOURS SCHEDULED
Qty: 120 TABLET | Refills: 0 | Status: SHIPPED | OUTPATIENT
Start: 2017-10-04

## 2017-10-04 RX ORDER — HYDROCODONE BITARTRATE AND ACETAMINOPHEN 5; 325 MG/1; MG/1
1 TABLET ORAL EVERY 4 HOURS PRN
Qty: 40 TABLET | Refills: 0 | COMMUNITY
Start: 2017-10-04 | End: 2017-10-23 | Stop reason: HOSPADM

## 2017-10-04 RX ADMIN — HYDROCODONE BITARTRATE AND ACETAMINOPHEN 1 TABLET: 5; 325 TABLET ORAL at 12:51

## 2017-10-04 RX ADMIN — CEFAZOLIN SODIUM 2 G: 2 INJECTION, SOLUTION INTRAVENOUS at 10:43

## 2017-10-04 RX ADMIN — DOCUSATE SODIUM 200 MG: 100 CAPSULE, LIQUID FILLED ORAL at 08:51

## 2017-10-04 RX ADMIN — HYDROCODONE BITARTRATE AND ACETAMINOPHEN 1 TABLET: 5; 325 TABLET ORAL at 04:05

## 2017-10-04 RX ADMIN — CEFAZOLIN SODIUM 2 G: 2 INJECTION, SOLUTION INTRAVENOUS at 03:50

## 2017-10-04 RX ADMIN — HYDROCODONE BITARTRATE AND ACETAMINOPHEN 1 TABLET: 5; 325 TABLET ORAL at 08:51

## 2017-10-04 RX ADMIN — BACLOFEN 5 MG: 10 TABLET ORAL at 12:21

## 2017-10-04 RX ADMIN — POLYETHYLENE GLYCOL 3350 17 G: 17 POWDER, FOR SOLUTION ORAL at 08:51

## 2017-10-04 NOTE — PLAN OF CARE
Problem: Patient Care Overview (Adult)  Goal: Plan of Care Review  Outcome: Unable to achieve outcome(s) by discharge Date Met:  10/04/17    10/04/17 0907   Coping/Psychosocial Response Interventions   Plan Of Care Reviewed With patient   Patient Care Overview   Progress improving   Outcome Evaluation   Outcome Summary/Follow up Plan Improved activity tolerance and functional mobility through progression of ther. ex. (in sitting, BLE AROM 20 reps ankle pumps, hip flexion, LAQs) and increased gait distance with decreased assist (470 ft CGA/SBA using straight cane). No LOB noted during gait training. Skilled inpt PT no longer necessary. PT will sign off.         Problem: Inpatient Physical Therapy  Goal: Bed Mobility Goal LTG- PT  Outcome: Unable to achieve outcome(s) by discharge Date Met:  10/04/17    10/03/17 1422 10/04/17 0907   Bed Mobility PT LTG   Bed Mobility PT LTG, Date Established 10/03/17 --    Bed Mobility PT LTG, Time to Achieve 2 - 3 days --    Bed Mobility PT LTG, Activity Type all bed mobility --    Bed Mobility PT LTG, [place] Level independent --    Bed Mobility PT LTG, Additional Goal via logroll --    Bed Mobility PT LTG, Date Goal Reviewed --  10/04/17   Bed Mobility PT LTG, Outcome --  goal not met  (Bed mobility not formally assessed d/t pt. already OOB.)   Bed Mobility PT LTG, Reason Goal Not Met --  discharged from facility       Goal: Transfer Training Goal 1 LTG- PT  Outcome: Unable to achieve outcome(s) by discharge Date Met:  10/04/17    10/03/17 1422 10/04/17 0907   Transfer Training PT LTG   Transfer Training PT LTG, Date Established 10/03/17 --    Transfer Training PT LTG, Time to Achieve 2 - 3 days --    Transfer Training PT LTG, Activity Type all transfers --    Transfer Training PT LTG, [place] Level independent --    Transfer Training PT LTG, Additional Goal With or without AAD --    Transfer Training PT LTG, Date Goal Reviewed --  10/04/17   Transfer Training PT LTG,  Outcome --  goal not met  (CGA/SBA for safety using straight cane)   Transfer Training PT LTG, Reason Goal Not Met --  discharged from facility       Goal: Gait Training Goal LTG- PT  Outcome: Unable to achieve outcome(s) by discharge Date Met:  10/04/17    10/03/17 1422 10/04/17 0907   Gait Training PT LTG   Gait Training Goal PT LTG, Date Established 10/03/17 --    Gait Training Goal PT LTG, Time to Achieve 2 - 3 days --    Gait Training Goal PT LTG, [place] Level independent --    Gait Training Goal PT LTG, Distance to Achieve 400 feet --    Gait Training Goal PT LTG, Additional Goal With or without A.A.D. --    Gait Training Goal PT LTG, Date Goal Reviewed --  10/04/17   Gait Training Goal PT LTG, Outcome --  goal partially met  (470 ft CGA/SBA using straight cane)   Gait Training Goal PT LTG, Reason Goal Not Met --  discharged from facility

## 2017-10-04 NOTE — DISCHARGE SUMMARY
Date of admission: 10/2/17  Date of discharge: 10/4/17    Hospital course:  Mr. Mosquera was admitted 2 days ago after presenting last week with a 5 month history of progressive myelopathy.  He was found to have a T8-T9 disc herniation with cord compression.  He underwent an uncomplicated left T8-T9 laminectomy/discectomy 2 days ago.  He is afebrile with vital signs stable.  He is ambulating without difficulty.  He has some hesitancy but is able to void and empty his bladder completely.  His pain is well controlled with oral pain medication.  He had some spasticity preoperatively and postoperatively and was started on baclofen yesterday which has helped.  His MAR drain only put out 20cc this last shift and will be removed this morning. At this point he is stable to go home.  We discussed his restrictions.  He will ambulate as much as possible, avoid any lifting pushing or pulling more than 5 pounds.  We discussed his wound care.  He will call with any fever or chills or incisional redness swelling or drainage.  He will follow-up in 2 weeks as scheduled for his first postop appointment.    D/c med rec:  Please see the d/c med rec for a complete list of discharge meds.

## 2017-10-04 NOTE — PLAN OF CARE
Problem: Pain, Acute (Adult)  Goal: Acceptable Pain Control/Comfort Level  Outcome: Ongoing (interventions implemented as appropriate)    10/04/17 0652   Pain, Acute (Adult)   Acceptable Pain Control/Comfort Level making progress toward outcome

## 2017-10-04 NOTE — THERAPY DISCHARGE NOTE
Acute Care - Physical Therapy Treatment Note/Discharge  [facility]     Patient Name: Cuba Mosquera  : 1970  MRN: [id number]  Today's Date: 10/4/2017  Onset of Illness/Injury or Date of Surgery Date: 10/02/17  Date of Referral to PT: 10/03/17  Referring Physician: Chelsie Mullen    Admit Date: 10/2/2017    Visit Dx:    ICD-10-CM ICD-9-CM   1. Difficulty walking R26.2 719.7   2. Herniated nucleus pulposus with myelopathy, thoracic M51.04 722.72     Patient Active Problem List   Diagnosis   • Herniated nucleus pulposus with myelopathy, thoracic       Physical Therapy Education     Title: PT OT SLP Therapies (Resolved)     Topic: Physical Therapy (Resolved)     Point: Mobility training (Resolved)    Learning Progress Summary    Learner Readiness Method Response Comment Documented by Status   Patient Acceptance E,D VU,DU  WB 10/04/17 0907 Done    Acceptance E,D VU,NR  MS 10/03/17 1422 Done               Point: Home exercise program (Resolved)    Learning Progress Summary    Learner Readiness Method Response Comment Documented by Status   Patient Acceptance E,D VU,DU  WB 10/04/17 0907 Done    Acceptance E,D VU,NR  MS 10/03/17 1422 Done               Point: Body mechanics (Resolved)    Learning Progress Summary    Learner Readiness Method Response Comment Documented by Status   Patient Acceptance ED VU,DU  WB 10/04/17 0907 Done    Acceptance E,D VU,NR  MS 10/03/17 1422 Done               Point: Precautions (Resolved)    Learning Progress Summary    Learner Readiness Method Response Comment Documented by Status   Patient Acceptance E,D VU,DU  WB 10/04/17 0907 Done    Acceptance E,D VU,NR  MS 10/03/17 1422 Done                      User Key     Initials Effective Dates Name Provider Type Discipline    MS 12/01/15 -  Ovidio Meza, PT Physical Therapist PT    WB 17 -  Cruz Powers, PT Student PT Student PT                    IP PT Goals       10/04/17 0907 10/03/17 1422       Bed Mobility PT LTG     Bed Mobility PT LTG, Date Established  10/03/17  -MS     Bed Mobility PT LTG, Time to Achieve  2 - 3 days  -MS     Bed Mobility PT LTG, Activity Type  all bed mobility  -MS     Bed Mobility PT LTG, [place] Level  independent  -MS     Bed Mobility PT LTG, Additional Goal  via logroll  -MS     Bed Mobility PT LTG, Date Goal Reviewed (P)  10/04/17  -WB      Bed Mobility PT LTG, Outcome (P)  goal not met   Bed mobility not formally assessed d/t pt. already OOB.  -WB      Bed Mobility PT LTG, Reason Goal Not Met (P)  discharged from facility  -WB      Transfer Training PT LTG    Transfer Training PT LTG, Date Established  10/03/17  -MS     Transfer Training PT LTG, Time to Achieve  2 - 3 days  -MS     Transfer Training PT LTG, Activity Type  all transfers  -MS     Transfer Training PT LTG, [place] Level  independent  -MS     Transfer Training PT LTG, Additional Goal  With or without AAD  -MS     Transfer Training PT  LTG, Date Goal Reviewed (P)  10/04/17  -WB      Transfer Training PT LTG, Outcome (P)  goal not met   CGA/SBA for safety using straight cane  -WB      Transfer Training PT LTG, Reason Goal Not Met (P)  discharged from facility  -WB      Gait Training PT LTG    Gait Training Goal PT LTG, Date Established  10/03/17  -MS     Gait Training Goal PT LTG, Time to Achieve  2 - 3 days  -MS     Gait Training Goal PT LTG, [place] Level  independent  -MS     Gait Training Goal PT LTG, Distance to Achieve  400 feet  -MS     Gait Training Goal PT LTG, Additional Goal  With or without A.A.D.  -MS     Gait Training Goal PT LTG, Date Goal Reviewed (P)  10/04/17  -WB      Gait Training Goal PT LTG, Outcome (P)  goal partially met   470 ft CGA/SBA using straight cane  -WB      Gait Training Goal PT LTG, Reason Goal Not Met (P)  discharged from facility  -WB        User Key  (r) = Recorded By, (t) = Taken By, (c) = Cosigned By    Initials Name Provider Type    MS Ovidio VIZCAINO Susana, PT Physical Therapist    WB  "Cruz Powers, PT Student PT Student              Adult Rehabilitation Note       10/04/17 0856          Rehab Assessment/Intervention    Discipline (P)  physical therapist  -WB      Document Type (P)  discharge summary;therapy note (daily note)  -WB      Subjective Information (P)  agree to therapy;complains of;pain  -WB      Patient Effort, Rehab Treatment (P)  good  -WB      Symptoms Noted During/After Treatment (P)  other (see comments)   \"stiffness\"  -WB      Symptoms Noted Comment (P)  When beginning gait training, pt. reported feeling very stiff in back and BLE.  -WB      Precautions/Limitations (P)  fall precautions;spinal precautions  -WB      Recorded by [WB] Cruz Powers, PT Student      Vital Signs    O2 Delivery Pre Treatment (P)  room air  -WB      Recorded by [WB] Cruz Powers PT Student      Pain Assessment    Pain Assessment (P)  0-10  -WB      Pain Score (P)  6  -WB      Post Pain Score (P)  6  -WB      Pain Type (P)  Acute pain;Surgical pain  -WB      Pain Location (P)  Back  -WB      Pain Intervention(s) (P)  Medication (See MAR);Repositioned;Ambulation/increased activity;Rest  -WB      Recorded by [WB] Cruz Powers, PT Student      Cognitive Assessment/Intervention    Current Cognitive/Communication Assessment (P)  functional  -WB      Orientation Status (P)  oriented x 4  -WB      Follows Commands/Answers Questions (P)  100% of the time  -WB      Personal Safety (P)  WNL/WFL  -WB      Personal Safety Interventions (P)  fall prevention program maintained;gait belt;nonskid shoes/slippers when out of bed;supervised activity  -WB      Recorded by [WB] Cruz Powers, PT Student      Bed Mobility, Assessment/Treatment    Bed Mobility, Comment (P)  Pt. up in chair upon entry  -WB      Recorded by [WB] Cruz Powers, PT Student      Transfer Assessment/Treatment    Transfers, Sit-Stand [place] (P)  stand by assist  -WB      Transfers, Stand-Sit [place] (P)  contact guard assist  -WB      " "Transfers, Sit-Stand-Sit, Assist Device (P)  straight cane  -WB      Transfer, Comment (P)  CGA/SBA for safety  -WB      Recorded by [WB] Cruz Powers PT Student      Gait Assessment/Treatment    Gait, [place] Level (P)  contact guard assist;stand by assist  -WB      Gait, Assistive Device (P)  straight cane  -WB      Gait, Distance (Feet) (P)  470  -WB      Gait, Gait Deviations (P)  antalgic;john paul decreased;other (see comments);toe-to-floor clearance decreased;bilateral:   wide DANIELLE, decreased step heighth  -WB      Gait, Safety Issues (P)  balance decreased during turns  -WB      Gait, Impairments (P)  impaired balance  -WB      Gait, Comment (P)  Verbal/tactile cues to facilitate increased BLE step heighth with increased knee flexion. Pt. still guarded in ambulation; however, he is conscious of being guarded and tells himself \"to relax\" to facilitate a reciprocal gait pattern.  -WB      Recorded by [WB] Cruz Powers PT Student      Motor Skills/Interventions    Additional Documentation (P)  Balance Skills Training (Group)  -WB      Recorded by [WB] Cruz Powers PT Student      Balance Skills Training    Sitting-Level of Assistance (P)  Independent  -WB      Standing-Level of Assistance (P)  Distant supervision  -WB      Static Standing Balance Support (P)  assistive device   straight cane  -WB      Standing-Balance Activities (P)  --   Activity tolerance  -WB      Recorded by [WB] Cruz Powers PT Student      Therapy Exercises    Bilateral Lower Extremities (P)  AROM:;20 reps;sitting;ankle pumps/circles;hip flexion;LAQ  -WB      Recorded by [WB] Cruz Powers PT Student      Positioning and Restraints    Pre-Treatment Position (P)  sitting in chair/recliner  -WB      Post Treatment Position (P)  chair  -WB      In Chair (P)  notified nsg;sitting;call light within reach;encouraged to call for assist;with nsg  -WB      Recorded by [WB] Cruz Powers PT Student        User Key  (r) = Recorded By, (t) = " Taken By, (c) = Cosigned By    Initials Name Effective Dates    WB Cruz Powers, PT Student 08/21/17 -           PT Recommendation and Plan  Anticipated Equipment Needs At Discharge:  (Pt. already owns a straight cane for ambulation)  Anticipated Discharge Disposition: home with assist, home with outpatient services  Planned Therapy Interventions: balance training, bed mobility training, gait training, home exercise program, patient/family education, postural re-education, strengthening, transfer training  PT Frequency: daily  Plan of Care Review  Plan Of Care Reviewed With: (P) patient  Progress: (P) improving  Outcome Summary/Follow up Plan: (P) Improved activity tolerance and functional mobility through progression of ther. ex. (in sitting, BLE AROM 20 reps ankle pumps, hip flexion, LAQs) and increased gait distance with decreased assist (470 ft CGA/SBA using straight cane). No LOB noted during gait training. Skilled inpt PT no longer necessary. PT will sign off.          Outcome Measures       10/04/17 0900 10/03/17 1400       How much help from another person do you currently need...    Turning from your back to your side while in flat bed without using bedrails? (P)  4  -WB 4  -MS     Moving from lying on back to sitting on the side of a flat bed without bedrails? (P)  4  -WB 3  -MS     Moving to and from a bed to a chair (including a wheelchair)? (P)  3  -WB 3  -MS     Standing up from a chair using your arms (e.g., wheelchair, bedside chair)? (P)  3  -WB 3  -MS     Climbing 3-5 steps with a railing? (P)  3  -WB 3  -MS     To walk in hospital room? (P)  3  -WB 3  -MS     AM-PAC 6 Clicks Score (P)  20  -WB 19  -MS     Functional Assessment    Outcome Measure Options (P)  AM-PAC 6 Clicks Basic Mobility (PT)  -WB AM-PAC 6 Clicks Basic Mobility (PT)  -MS       User Key  (r) = Recorded By, (t) = Taken By, (c) = Cosigned By    Initials Name Provider Type    MS Ovidio Meza, PT Physical Therapist    SILVIA Arroyo  Gio PT Student PT Student           Time Calculation:         PT Charges       10/04/17 0923          Time Calculation    Start Time (P)  0831  -WB      Stop Time (P)  0852  -WB      Time Calculation (min) (P)  21 min  -WB      PT Received On (P)  10/04/17  -WB        User Key  (r) = Recorded By, (t) = Taken By, (c) = Cosigned By    Initials Name Provider Type    WB Cruz Powers PT Student PT Student          Therapy Charges for Today     Code Description Service Date Service Provider Modifiers Qty    92606939494 HC PT THER PROC EA 15 MIN 10/4/2017 Cruz Powers PT Student GP 1          PT G-Codes  PT Professional Judgement Used?: Yes  Outcome Measure Options: (P) AM-PAC 6 Clicks Basic Mobility (PT)  Functional Limitation: Mobility: Walking and moving around  Mobility: Walking and Moving Around Current Status (): At least 1 percent but less than 20 percent impaired, limited or restricted  Mobility: Walking and Moving Around Goal Status (): 0 percent impaired, limited or restricted    PT Discharge Summary  Reason for Discharge: (P) Discharge from facility  Outcomes Achieved: (P) Refer to plan of care for updates on goals achieved  Discharge Destination: (P) Home with outpatient services    Cruz Powers PT Student  10/4/2017

## 2017-10-04 NOTE — DISCHARGE INSTRUCTIONS
He will ambulate as much as possible, avoid any lifting pushing or pulling more than 5 pounds.  We discussed his wound care.  He will call with any fever or chills or incisional redness swelling or drainage.  He will follow-up in 2 weeks as scheduled for his first postop appointment.

## 2017-10-06 ENCOUNTER — TELEPHONE (OUTPATIENT)
Dept: NEUROSURGERY | Facility: CLINIC | Age: 47
End: 2017-10-06

## 2017-10-06 NOTE — TELEPHONE ENCOUNTER
How are you feeling? Yes a bit sore    Are you having any pain? Where? Upper spine    Rate pain from 1-10 -  4    Are you taking the pain RX? Yes    Do you think it's helping? Yes    Do you feel better than before surgery? Maybe a little    Was your dressing clean and dry? It had a small pin size amount of clear drainage    Dermabond OK? I am not sure, wife looks at the incision.    Is you incision red, swollen or bleeding? No bleeding, some redness from tape (He is allergic to adhesive which he states he is taking benadryl for) No fever, he will watch the redness and call with any problems.    Are you having any trouble with nausea or constipation? Yes a bit constipated however he is taking Doculax, I did tell him if that does not help he can try getting some Magnesium Citrate which will clear him out.    Were your discharge instructions easy to understand? Yes    Any other questions? [facility], had a paper to fill out. I checked with Radha and she has it and will get it filled out and sent in.     Confirm post op appt - Yes 10/23/2017 at 345.

## 2017-10-23 ENCOUNTER — OFFICE VISIT (OUTPATIENT)
Dept: NEUROSURGERY | Facility: CLINIC | Age: 47
End: 2017-10-23

## 2017-10-23 VITALS
DIASTOLIC BLOOD PRESSURE: 84 MMHG | WEIGHT: 253 LBS | HEART RATE: 85 BPM | BODY MASS INDEX: 35.42 KG/M2 | SYSTOLIC BLOOD PRESSURE: 119 MMHG | HEIGHT: 71 IN

## 2017-10-23 DIAGNOSIS — Z09 SURGERY FOLLOW-UP EXAMINATION: Primary | ICD-10-CM

## 2017-10-23 PROCEDURE — 99024 POSTOP FOLLOW-UP VISIT: CPT | Performed by: PHYSICIAN ASSISTANT

## 2017-10-23 NOTE — PROGRESS NOTES
Subjective   Patient ID: Cuba Mosquera is a 47 y.o. male is here today for follow-up.  He is 3 wks out from a left T8-T9 laminectomy by Dr. Graff 10/2/17.  He is doing well. He states he has some soreness at incision but no actual pain.  He notes he is walking daily.  Mr. Mosquera takes Baclofen 10 mg QD and  Aleve 220 mg PRN for pain.     Back Pain   The pain is at a severity of 3/10. The pain is mild. Pertinent negatives include no fever or weakness.       The following portions of the patient's history were reviewed and updated as appropriate: allergies, current medications, past family history, past medical history, past social history, past surgical history and problem list.    Review of Systems   Constitutional: Negative for fever.   Genitourinary: Negative for difficulty urinating.   Musculoskeletal: Positive for back pain and gait problem.   Neurological: Negative for weakness.   All other systems reviewed and are negative.      Objective   Physical Exam   Neurological:   Incision healed     Neurologic Exam    Assessment/Plan   Independent Review of Radiographic Studies:    Medical Decision Making:    Mr. Mosquera is 3 weeks out from a left T8-T9 laminectomy for myelopathy.  He is doing very well and only reports mild incisional pain and mild intermittent lumbar pain.  He is using Aleve as needed for discomfort and using baclofen as needed for some of the muscle tightness in his back and some of the spasticity in his right leg.  He is walking up to 2 miles per day.  His wound has healed very nicely.  He denies any urinary retention or incontinence.  He and his wife both feel that his gait has improved some since surgery although certainly not back to normal.  He continues to use either a walking stick or cane as needed.    He will begin physical therapy.  We did discuss his restrictions.  He may return to regular duty work as of November 13.    He will follow-up in 4 weeks and call sooner with concerns.    [place] was seen today for post-op.    Diagnoses and all orders for this visit:    Surgery follow-up examination  -     Ambulatory Referral to Physical Therapy Evaluate and treat (2-3 times per week for 4wks. )    Return in about 4 weeks (around 11/20/2017) for with Dr. Graff.

## 2017-11-28 ENCOUNTER — OFFICE VISIT (OUTPATIENT)
Dept: NEUROSURGERY | Facility: CLINIC | Age: 47
End: 2017-11-28

## 2017-11-28 VITALS — HEART RATE: 85 BPM | DIASTOLIC BLOOD PRESSURE: 93 MMHG | SYSTOLIC BLOOD PRESSURE: 143 MMHG

## 2017-11-28 DIAGNOSIS — Z09 SURGERY FOLLOW-UP EXAMINATION: Primary | ICD-10-CM

## 2017-11-28 PROCEDURE — 99024 POSTOP FOLLOW-UP VISIT: CPT | Performed by: NEUROLOGICAL SURGERY

## 2017-11-28 NOTE — PROGRESS NOTES
Subjective   Patient ID: Cuba Mosquera is a 47 y.o. male is here today for follow-up. He is 8 wks out from a left T8-T9 laminectomy done on 10/2/17. He has some muscle soreness.    History of Present Illness     This patient returns today.  He is doing very well.  He is walking better and has minimal pain.  His incision has healed completely.    The following portions of the patient's history were reviewed and updated as appropriate: allergies, current medications, past family history, past medical history, past social history, past surgical history and problem list.    Review of Systems   Respiratory: Negative for chest tightness and shortness of breath.    Cardiovascular: Negative for chest pain.   Musculoskeletal: Positive for back pain ( Soreness).   Neurological: Negative for weakness and numbness.   All other systems reviewed and are negative.      Objective   Physical Exam   Constitutional: He is oriented to person, place, and time. He appears well-developed and well-nourished.   Neurological: He is oriented to person, place, and time.     Neurologic Exam     Mental Status   Oriented to person, place, and time.       Assessment/Plan   Independent Review of Radiographic Studies:      Medical Decision Making:      I told the patient and his wife that from my point of view he can return to normal activities whenever he feels like it.  If he feels like he would benefit from further therapy I told him to have the therapist call us and we will reorder it.    Cuba was seen today for post-op and back pain.    Diagnoses and all orders for this visit:    Surgery follow-up examination    Return if symptoms worsen or fail to improve.

## 2018-02-05 ENCOUNTER — CONVERSION ENCOUNTER (OUTPATIENT)
Dept: OTHER | Facility: HOSPITAL | Age: 48
End: 2018-02-05

## 2018-07-02 ENCOUNTER — CONVERSION ENCOUNTER (OUTPATIENT)
Dept: OTHER | Facility: HOSPITAL | Age: 48
End: 2018-07-02

## 2018-07-02 ENCOUNTER — HOSPITAL ENCOUNTER (OUTPATIENT)
Dept: URGENT CARE | Facility: CLINIC | Age: 48
Discharge: HOME | End: 2018-07-02
Attending: FAMILY MEDICINE | Admitting: FAMILY MEDICINE

## 2018-08-07 ENCOUNTER — HOSPITAL ENCOUNTER (OUTPATIENT)
Dept: URGENT CARE | Facility: CLINIC | Age: 48
Setting detail: SPECIMEN
Discharge: HOME | End: 2018-08-07
Attending: FAMILY MEDICINE | Admitting: FAMILY MEDICINE

## 2018-08-07 ENCOUNTER — CONVERSION ENCOUNTER (OUTPATIENT)
Dept: OTHER | Facility: HOSPITAL | Age: 48
End: 2018-08-07

## 2018-08-07 LAB
BACTERIA SPEC AEROBE CULT: NORMAL
Lab: NORMAL
MICRO REPORT STATUS: NORMAL
SPECIMEN SOURCE: NORMAL

## 2018-08-14 ENCOUNTER — HOSPITAL ENCOUNTER (OUTPATIENT)
Dept: LAB | Facility: HOSPITAL | Age: 48
Discharge: HOME | End: 2018-08-14
Attending: FAMILY MEDICINE | Admitting: FAMILY MEDICINE

## 2018-08-14 LAB
ALBUMIN SERPL-MCNC: 4.4 G/DL (ref 3.5–4.8)
ALBUMIN/GLOB SERPL: 1.3 {RATIO} (ref 1–1.7)
ALP SERPL-CCNC: 55 IU/L (ref 32–91)
ALT SERPL-CCNC: 37 IU/L (ref 17–63)
ANION GAP SERPL CALC-SCNC: 13.2 MMOL/L (ref 10–20)
AST SERPL-CCNC: 25 IU/L (ref 15–41)
BILIRUB SERPL-MCNC: 1.2 MG/DL (ref 0.3–1.2)
BUN SERPL-MCNC: 11 MG/DL (ref 8–20)
BUN/CREAT SERPL: 12.2 (ref 6.2–20.3)
CALCIUM SERPL-MCNC: 9.7 MG/DL (ref 8.9–10.3)
CHLORIDE SERPL-SCNC: 104 MMOL/L (ref 101–111)
CHOLEST SERPL-MCNC: 201 MG/DL
CHOLEST/HDLC SERPL: 4.5 {RATIO}
CONV CO2: 27 MMOL/L (ref 22–32)
CONV LDL CHOLESTEROL DIRECT: 120 MG/DL (ref 0–100)
CONV TOTAL PROTEIN: 7.8 G/DL (ref 6.1–7.9)
CREAT UR-MCNC: 0.9 MG/DL (ref 0.7–1.2)
GLOBULIN UR ELPH-MCNC: 3.4 G/DL (ref 2.5–3.8)
GLUCOSE SERPL-MCNC: 95 MG/DL (ref 65–99)
HDLC SERPL-MCNC: 45 MG/DL
LDLC/HDLC SERPL: 2.7 {RATIO}
LIPID INTERPRETATION: ABNORMAL
POTASSIUM SERPL-SCNC: 4.2 MMOL/L (ref 3.6–5.1)
SODIUM SERPL-SCNC: 140 MMOL/L (ref 136–144)
TRIGL SERPL-MCNC: 179 MG/DL
VLDLC SERPL CALC-MCNC: 36.4 MG/DL

## 2019-02-11 ENCOUNTER — CONVERSION ENCOUNTER (OUTPATIENT)
Dept: OTHER | Facility: HOSPITAL | Age: 49
End: 2019-02-11

## 2019-06-04 VITALS
WEIGHT: 260 LBS | SYSTOLIC BLOOD PRESSURE: 134 MMHG | OXYGEN SATURATION: 96 % | HEART RATE: 89 BPM | BODY MASS INDEX: 36.12 KG/M2 | SYSTOLIC BLOOD PRESSURE: 140 MMHG | SYSTOLIC BLOOD PRESSURE: 132 MMHG | BODY MASS INDEX: 36.54 KG/M2 | SYSTOLIC BLOOD PRESSURE: 158 MMHG | DIASTOLIC BLOOD PRESSURE: 91 MMHG | DIASTOLIC BLOOD PRESSURE: 94 MMHG | HEART RATE: 88 BPM | WEIGHT: 257.2 LBS | DIASTOLIC BLOOD PRESSURE: 91 MMHG | WEIGHT: 261 LBS | DIASTOLIC BLOOD PRESSURE: 88 MMHG | HEIGHT: 71 IN | WEIGHT: 258 LBS | BODY MASS INDEX: 36.34 KG/M2 | SYSTOLIC BLOOD PRESSURE: 145 MMHG | HEART RATE: 90 BPM | BODY MASS INDEX: 35.87 KG/M2 | HEIGHT: 71 IN | OXYGEN SATURATION: 98 % | RESPIRATION RATE: 16 BRPM | HEART RATE: 86 BPM | WEIGHT: 259.6 LBS | DIASTOLIC BLOOD PRESSURE: 94 MMHG | RESPIRATION RATE: 16 BRPM | HEIGHT: 71 IN | HEART RATE: 89 BPM

## 2019-09-17 ENCOUNTER — TELEPHONE (OUTPATIENT)
Dept: URGENT CARE | Facility: CLINIC | Age: 49
End: 2019-09-17

## 2019-09-17 DIAGNOSIS — N39.0 RECURRENT UTI: Primary | ICD-10-CM

## 2019-09-17 RX ORDER — CEFDINIR 300 MG/1
300 CAPSULE ORAL 2 TIMES DAILY
Qty: 14 CAPSULE | Refills: 0 | Status: SHIPPED | OUTPATIENT
Start: 2019-09-17

## 2019-09-17 NOTE — TELEPHONE ENCOUNTER
Patent states he has not improved, his sx have not gotten any worse either. He thinks the antibiotic he is on isn't strong enough and is wanting to further instruction      We could change to something like Levaquin, but I'm concerned that there has been no change in symptoms.  He needs to call his PCP (Dr. Ribeiro's office?)  this afternoon for prompt follow-up and possibly (re)evaluation by urology.

## 2019-09-17 NOTE — TELEPHONE ENCOUNTER
He stated he would like a stronger antibiotic and he does have a f/u appointment with his PCP on Friday

## 2019-09-18 ENCOUNTER — LAB (OUTPATIENT)
Dept: LAB | Facility: HOSPITAL | Age: 49
End: 2019-09-18

## 2019-09-18 ENCOUNTER — TRANSCRIBE ORDERS (OUTPATIENT)
Dept: ADMINISTRATIVE | Facility: HOSPITAL | Age: 49
End: 2019-09-18

## 2019-09-18 DIAGNOSIS — R31.9 HEMATURIA SYNDROME: ICD-10-CM

## 2019-09-18 DIAGNOSIS — Z12.5 SPECIAL SCREENING FOR MALIGNANT NEOPLASM OF PROSTATE: ICD-10-CM

## 2019-09-18 DIAGNOSIS — Z12.5 SPECIAL SCREENING FOR MALIGNANT NEOPLASM OF PROSTATE: Primary | ICD-10-CM

## 2019-09-18 LAB
ANION GAP SERPL CALCULATED.3IONS-SCNC: 11.7 MMOL/L (ref 5–15)
BUN BLD-MCNC: 10 MG/DL (ref 8–20)
BUN/CREAT SERPL: 9.1 (ref 6.2–20.3)
CALCIUM SPEC-SCNC: 9.7 MG/DL (ref 8.9–10.3)
CHLORIDE SERPL-SCNC: 103 MMOL/L (ref 101–111)
CO2 SERPL-SCNC: 28 MMOL/L (ref 22–32)
CREAT BLD-MCNC: 1.1 MG/DL (ref 0.7–1.2)
DEPRECATED RDW RBC AUTO: 41.6 FL (ref 37–54)
EOSINOPHIL # BLD MANUAL: 0.15 10*3/MM3 (ref 0–0.4)
EOSINOPHIL NFR BLD MANUAL: 2 % (ref 0.3–6.2)
ERYTHROCYTE [DISTWIDTH] IN BLOOD BY AUTOMATED COUNT: 13 % (ref 12.3–15.4)
GFR SERPL CREATININE-BSD FRML MDRD: 71 ML/MIN/1.73
GIANT PLATELETS: NORMAL
GLUCOSE BLD-MCNC: 120 MG/DL (ref 65–99)
HCT VFR BLD AUTO: 45.9 % (ref 37.5–51)
HGB BLD-MCNC: 15.6 G/DL (ref 13–17.7)
LYMPHOCYTES # BLD MANUAL: 1.55 10*3/MM3 (ref 0.7–3.1)
LYMPHOCYTES NFR BLD MANUAL: 21 % (ref 19.6–45.3)
LYMPHOCYTES NFR BLD MANUAL: 7 % (ref 5–12)
MCH RBC QN AUTO: 31 PG (ref 26.6–33)
MCHC RBC AUTO-ENTMCNC: 34 G/DL (ref 31.5–35.7)
MCV RBC AUTO: 91.2 FL (ref 79–97)
MONOCYTES # BLD AUTO: 0.52 10*3/MM3 (ref 0.1–0.9)
NEUTROPHILS # BLD AUTO: 5.18 10*3/MM3 (ref 1.7–7)
NEUTROPHILS NFR BLD MANUAL: 69 % (ref 42.7–76)
NEUTS BAND NFR BLD MANUAL: 1 % (ref 0–5)
PLATELET # BLD AUTO: 254 10*3/MM3 (ref 140–450)
PMV BLD AUTO: 8 FL (ref 6–12)
POTASSIUM BLD-SCNC: 4.7 MMOL/L (ref 3.6–5.1)
PSA SERPL-MCNC: 0.82 NG/ML (ref 0–4)
RBC # BLD AUTO: 5.04 10*6/MM3 (ref 4.14–5.8)
RBC MORPH BLD: NORMAL
SODIUM BLD-SCNC: 138 MMOL/L (ref 136–144)
WBC MORPH BLD: NORMAL
WBC NRBC COR # BLD: 7.4 10*3/MM3 (ref 3.4–10.8)

## 2019-09-18 PROCEDURE — 85007 BL SMEAR W/DIFF WBC COUNT: CPT

## 2019-09-18 PROCEDURE — 85025 COMPLETE CBC W/AUTO DIFF WBC: CPT

## 2019-09-18 PROCEDURE — 36415 COLL VENOUS BLD VENIPUNCTURE: CPT

## 2019-09-18 PROCEDURE — 80048 BASIC METABOLIC PNL TOTAL CA: CPT

## 2019-09-18 PROCEDURE — 84153 ASSAY OF PSA TOTAL: CPT

## 2019-09-28 ENCOUNTER — TRANSCRIBE ORDERS (OUTPATIENT)
Dept: ADMINISTRATIVE | Facility: HOSPITAL | Age: 49
End: 2019-09-28

## 2019-09-28 ENCOUNTER — HOSPITAL ENCOUNTER (OUTPATIENT)
Dept: GENERAL RADIOLOGY | Facility: HOSPITAL | Age: 49
Discharge: HOME | End: 2019-09-28
Admitting: UROLOGY

## 2019-09-28 DIAGNOSIS — N20.1 CALCULUS OF URETER: Primary | ICD-10-CM

## 2019-09-28 DIAGNOSIS — N20.1 CALCULUS OF URETER: ICD-10-CM

## 2019-09-28 PROCEDURE — 74018 RADEX ABDOMEN 1 VIEW: CPT

## 2019-10-04 ENCOUNTER — HOSPITAL ENCOUNTER (OUTPATIENT)
Dept: CARDIOLOGY | Facility: HOSPITAL | Age: 49
Discharge: HOME | End: 2019-10-04
Admitting: ANESTHESIOLOGY

## 2019-10-04 ENCOUNTER — TRANSCRIBE ORDERS (OUTPATIENT)
Dept: ADMINISTRATIVE | Facility: HOSPITAL | Age: 49
End: 2019-10-04

## 2019-10-04 DIAGNOSIS — N20.0 KIDNEY STONE: Primary | ICD-10-CM

## 2019-10-04 DIAGNOSIS — N20.0 KIDNEY STONE: ICD-10-CM

## 2019-10-04 PROCEDURE — 93005 ELECTROCARDIOGRAM TRACING: CPT | Performed by: ANESTHESIOLOGY

## 2019-10-06 PROCEDURE — 93010 ELECTROCARDIOGRAM REPORT: CPT | Performed by: INTERNAL MEDICINE

## 2019-11-19 ENCOUNTER — HOSPITAL ENCOUNTER (OUTPATIENT)
Dept: GENERAL RADIOLOGY | Facility: HOSPITAL | Age: 49
Discharge: HOME | End: 2019-11-19
Admitting: UROLOGY

## 2019-11-19 ENCOUNTER — TRANSCRIBE ORDERS (OUTPATIENT)
Dept: ADMINISTRATIVE | Facility: HOSPITAL | Age: 49
End: 2019-11-19

## 2019-11-19 DIAGNOSIS — N20.1 CALCULUS OF URETER: Primary | ICD-10-CM

## 2019-11-19 DIAGNOSIS — N20.1 CALCULUS OF URETER: ICD-10-CM

## 2019-11-19 PROCEDURE — 74018 RADEX ABDOMEN 1 VIEW: CPT

## 2019-11-27 ENCOUNTER — TRANSCRIBE ORDERS (OUTPATIENT)
Dept: ADMINISTRATIVE | Facility: HOSPITAL | Age: 49
End: 2019-11-27

## 2019-11-27 ENCOUNTER — LAB (OUTPATIENT)
Dept: LAB | Facility: HOSPITAL | Age: 49
End: 2019-11-27

## 2019-11-27 DIAGNOSIS — I15.9 SECONDARY HYPERTENSION: Primary | ICD-10-CM

## 2019-11-27 DIAGNOSIS — I15.9 SECONDARY HYPERTENSION: ICD-10-CM

## 2019-11-27 LAB
ALBUMIN SERPL-MCNC: 5 G/DL (ref 3.5–5.2)
ALBUMIN UR-MCNC: <1.2 MG/DL
ALBUMIN/GLOB SERPL: 1.5 G/DL
ALP SERPL-CCNC: 57 U/L (ref 39–117)
ALT SERPL W P-5'-P-CCNC: 21 U/L (ref 1–41)
ANION GAP SERPL CALCULATED.3IONS-SCNC: 14.7 MMOL/L (ref 5–15)
AST SERPL-CCNC: 17 U/L (ref 1–40)
BILIRUB SERPL-MCNC: 0.7 MG/DL (ref 0.2–1.2)
BUN BLD-MCNC: 18 MG/DL (ref 6–20)
BUN/CREAT SERPL: 16.8 (ref 7–25)
CALCIUM SPEC-SCNC: 10.1 MG/DL (ref 8.6–10.5)
CHLORIDE SERPL-SCNC: 97 MMOL/L (ref 98–107)
CHOLEST SERPL-MCNC: 180 MG/DL (ref 0–200)
CO2 SERPL-SCNC: 28.3 MMOL/L (ref 22–29)
CREAT BLD-MCNC: 1.07 MG/DL (ref 0.76–1.27)
CREAT UR-MCNC: 20.3 MG/DL
GFR SERPL CREATININE-BSD FRML MDRD: 73 ML/MIN/1.73
GLOBULIN UR ELPH-MCNC: 3.4 GM/DL
GLUCOSE BLD-MCNC: 104 MG/DL (ref 65–99)
HDLC SERPL-MCNC: 57 MG/DL (ref 40–60)
LDLC SERPL CALC-MCNC: 102 MG/DL (ref 0–100)
LDLC/HDLC SERPL: 1.79 {RATIO}
MICROALBUMIN/CREAT UR: NORMAL MG/G{CREAT}
POTASSIUM BLD-SCNC: 3.8 MMOL/L (ref 3.5–5.2)
PROT SERPL-MCNC: 8.4 G/DL (ref 6–8.5)
SODIUM BLD-SCNC: 140 MMOL/L (ref 136–145)
TRIGL SERPL-MCNC: 105 MG/DL (ref 0–150)
VLDLC SERPL-MCNC: 21 MG/DL (ref 5–40)

## 2019-11-27 PROCEDURE — 82043 UR ALBUMIN QUANTITATIVE: CPT

## 2019-11-27 PROCEDURE — 80053 COMPREHEN METABOLIC PANEL: CPT

## 2019-11-27 PROCEDURE — 82570 ASSAY OF URINE CREATININE: CPT

## 2019-11-27 PROCEDURE — 36415 COLL VENOUS BLD VENIPUNCTURE: CPT

## 2019-11-27 PROCEDURE — 80061 LIPID PANEL: CPT

## 2019-12-12 ENCOUNTER — TELEPHONE (OUTPATIENT)
Dept: NEUROLOGY | Facility: CLINIC | Age: 49
End: 2019-12-12

## 2019-12-12 DIAGNOSIS — G47.33 OBSTRUCTIVE SLEEP APNEA: Primary | ICD-10-CM

## 2020-01-15 NOTE — PROGRESS NOTES
Subjective   History of Present Illness: Cuba Mosquera is a 49 y.o. male is here today for follow-up for low back pain and right leg numbness in December. Patient had lumbar MRI at [facility] yesterday. His PCP prescribed MDP which helped some, he has had massage therapy which has helped the most.  He has not had recent PT or LINDA.       Patient states that his symptoms improved since his appointment was made, he is now having intermittent low back discomfort.  He denies leg pain.  He has right leg weakness and intermittent N/T right leg.  No problems with his balance and gait.    Patient was last seen by Dr Graff on 11.28.17 for post op appt and was doing well, he was released prn. He had surgery 10.2.17 for Left T8/9 laminectomy and discectomy by Dr Graff.    He says prior to surgery he was dragging his right leg and had been symptomatic for six months.    He was concerned because the symptoms reminded him of how he felt prior to surgery in 2017. At this point he says he is back to the residual symptoms he had since surgery.     Back Pain   The current episode started more than 1 month ago. The problem occurs intermittently. The problem has been gradually improving since onset. The pain is present in the lumbar spine. The pain does not radiate. The pain is at a severity of 3/10. The pain is mild. Associated symptoms include weakness.   Extremity Weakness    The pain is present in the right upper leg and right lower leg. The pain is at a severity of 3/10. The pain is mild.       The following portions of the patient's history were reviewed and updated as appropriate: allergies, current medications, past family history, past medical history, past social history, past surgical history and problem list.    Review of Systems   Musculoskeletal: Positive for back pain and extremity weakness. Negative for arthralgias, gait problem and myalgias.   Neurological: Positive for weakness.       Objective     Vitals:  "   20 1549   BP: 140/74   Pulse: 74   Weight: 110 kg (243 lb)   Height: 177.8 cm (70\")     Body mass index is 34.87 kg/m².      Physical Exam   Constitutional: He is oriented to person, place, and time. He appears well-developed and well-nourished.   HENT:   Head: Normocephalic.   Eyes: Pupils are equal, round, and reactive to light. EOM are normal.   Neck: Normal range of motion.   Cardiovascular: Normal rate.   Pulmonary/Chest: Effort normal.   Musculoskeletal: Normal range of motion.   Neurological: He is alert and oriented to person, place, and time. He has a normal Finger-Nose-Finger Test and a normal Heel to Shin Test. Gait normal.   Reflex Scores:       Tricep reflexes are 2+ on the right side and 2+ on the left side.       Bicep reflexes are 2+ on the right side and 2+ on the left side.       Brachioradialis reflexes are 2+ on the right side and 2+ on the left side.       Patellar reflexes are 3+ on the right side and 3+ on the left side.       Achilles reflexes are 2+ on the right side and 2+ on the left side.  Skin: Skin is warm and dry.   Psychiatric: He has a normal mood and affect. His speech is normal.     Neurologic Exam     Mental Status   Oriented to person, place, and time.   Attention: normal. Concentration: normal.   Speech: speech is normal   Level of consciousness: alert    Cranial Nerves     CN III, IV, VI   Pupils are equal, round, and reactive to light.  Extraocular motions are normal.     Motor Exam   Muscle bulk: normal  Overall muscle tone: normal  Right arm tone: normal  Left arm tone: normal  Right arm pronator drift: absent  Left arm pronator drift: absent  Right leg tone: normal  Left leg tone: normal    Strength   Right deltoid: 5/5  Left deltoid: 5/5  Right biceps: 5/5  Left biceps: 5/5  Right triceps: 5/5  Left triceps: 5/5  Right wrist extension: 5/5  Left wrist extension: 5/5  Right quadriceps: 5/5  Left quadriceps: 5/5  Right hamstrin/5  Left hamstrin/5  Right " posterior tibial: 5/5  Left posterior tibial: 5/5  Right gastroc: 5/5  Left gastroc: 5/5    Sensory Exam   Right leg vibration: decreased from knee    Gait, Coordination, and Reflexes     Gait  Gait: normal    Coordination   Finger to nose coordination: normal  Heel to shin coordination: normal    Reflexes   Right brachioradialis: 2+  Left brachioradialis: 2+  Right biceps: 2+  Left biceps: 2+  Right triceps: 2+  Left triceps: 2+  Right patellar: 3+  Left patellar: 3+  Right achilles: 2+  Left achilles: 2+  Right Nicholson: absent  Left Nicholson: absent  Right ankle clonus: absent  Left ankle clonus: absent  2-3 beats of clonus bilaterally       SLRT Left negative, Right negative       Assessment/Plan   Independent Review of Radiographic Studies:      I personally reviewed the images from the following studies.    Lumbar MRI at [facility] 1/16/2020 was reviewed and reveals normal alignment, moderate facet degenerative changes L4-5 and L5-S1, mild discogenic changes contributing to right greater than left foraminal and lateral recess stenosis at both levels, no high-grade stenosis at these or any other levels.  Agree with report.    Medical Decision Making:      At this point the leg symptoms have pretty much resolved.  He still has the residual numbness and tingling below the knee that he has had since his prior thoracic surgery.  He does have quicker than average reflexes at the knees, that is presumably from his prior thoracic disc disease.  He reports he is actually back to his baseline now.  We will add some therapy as he says he wants to lose some weight they can work on that as well as teaching him some stretching and strengthening exercises to keep his back healthy.  For now, since he does not have loss of nerve function on exam we will plan on seeing him back as needed in the future.  He understands call at anytime he has questions or concerns.    [place] was seen today for back pain, extremity weakness and  numbness.    Diagnoses and all orders for this visit:    Lumbar radiculopathy  -     Ambulatory Referral to Physical Therapy Evaluate and treat      Return if symptoms worsen or fail to improve.

## 2020-01-17 ENCOUNTER — OFFICE VISIT (OUTPATIENT)
Dept: NEUROSURGERY | Facility: CLINIC | Age: 50
End: 2020-01-17

## 2020-01-17 VITALS
HEART RATE: 74 BPM | BODY MASS INDEX: 34.79 KG/M2 | HEIGHT: 70 IN | WEIGHT: 243 LBS | SYSTOLIC BLOOD PRESSURE: 140 MMHG | DIASTOLIC BLOOD PRESSURE: 74 MMHG

## 2020-01-17 DIAGNOSIS — M54.16 LUMBAR RADICULOPATHY: Primary | ICD-10-CM

## 2020-01-17 PROCEDURE — 99213 OFFICE O/P EST LOW 20 MIN: CPT | Performed by: NURSE PRACTITIONER

## 2020-01-17 RX ORDER — CHLORTHALIDONE 25 MG/1
25 TABLET ORAL DAILY
COMMUNITY

## 2020-02-11 ENCOUNTER — OFFICE VISIT (OUTPATIENT)
Dept: NEUROLOGY | Facility: CLINIC | Age: 50
End: 2020-02-11

## 2020-02-11 VITALS
WEIGHT: 249 LBS | SYSTOLIC BLOOD PRESSURE: 124 MMHG | HEART RATE: 93 BPM | DIASTOLIC BLOOD PRESSURE: 83 MMHG | HEIGHT: 71 IN | BODY MASS INDEX: 34.86 KG/M2

## 2020-02-11 DIAGNOSIS — G47.33 OBSTRUCTIVE SLEEP APNEA SYNDROME: Primary | ICD-10-CM

## 2020-02-11 DIAGNOSIS — E66.09 CLASS 1 OBESITY DUE TO EXCESS CALORIES WITH BODY MASS INDEX (BMI) OF 34.0 TO 34.9 IN ADULT, UNSPECIFIED WHETHER SERIOUS COMORBIDITY PRESENT: ICD-10-CM

## 2020-02-11 PROBLEM — G47.30 SLEEP APNEA: Status: ACTIVE | Noted: 2020-02-11

## 2020-02-11 PROCEDURE — 99213 OFFICE O/P EST LOW 20 MIN: CPT | Performed by: PSYCHIATRY & NEUROLOGY

## 2020-02-11 NOTE — PROGRESS NOTES
Sleep medicine follow-up visit    Cuba Mosquera   1970  49 y.o. male   DATE OF SERVICE: 2/11/2020     Chief complaint molly treated with cpap    Yearly f/u for CPAP compliance, pt. doing well with cpap compliance. Pt. uses a full face mask and goes through [employer] for supplies and would like to go with [employer].   Sleeps every night all night. No significant air leaks. occ wakes with air leaks    Feels rested during day epworth 0     Obesity, unchanged     On NPSG at [facility] , 11/6/2012 patient had Moderate obstructive sleep apnea syndrome with apnea-hypopnea index of 24 per sleep hour, minimum SpO2 of 96.1%    The compliance data will be reviewed when available.    Review of Systems   Constitutional: Positive for fatigue. Negative for appetite change.   HENT: Negative for sinus pressure and sinus pain.    Eyes: Negative for pain and itching.   Respiratory: Negative for cough and shortness of breath.    Gastrointestinal: Negative for constipation and diarrhea.   Endocrine: Negative for cold intolerance and heat intolerance.   Genitourinary: Negative for difficulty urinating and frequency.   Musculoskeletal: Positive for back pain. Negative for neck pain.   Allergic/Immunologic: Negative for environmental allergies and food allergies.   Neurological: Negative for dizziness, tremors, seizures, syncope, facial asymmetry, speech difficulty, weakness, light-headedness, numbness and headaches.   Psychiatric/Behavioral: Negative for agitation and confusion.     I reviewed and addressed ROS entered by MA.    The following portions of the patient's history were reviewed and updated as appropriate: allergies, current medications, past family history, past medical history, past social history, past surgical history and problem list.      Family History   Problem Relation Age of Onset   • Cancer Father    • Malig Hyperthermia Neg Hx        Past Medical History:   Diagnosis Date   • Anxiety    • Back pain    •  History of shingles     2016   • Pupil dilation     LEFT EYE, DUE TO RETINA SURGERY   • Sleep apnea    • Stickler syndrome        Social History     Socioeconomic History   • Marital status:      Spouse name: Not on file   • Number of children: 0   • Years of education: DEVAUGHN   • Highest education level: Not on file   Occupational History   • Occupation: Consultant   Tobacco Use   • Smoking status: Never Smoker   • Smokeless tobacco: Never Used   Substance and Sexual Activity   • Alcohol use: Yes     Alcohol/week: 1.0 standard drinks     Types: 1 Cans of beer per week   • Drug use: No   • Sexual activity: Defer         Current Outpatient Medications:   •  baclofen (LIORESAL) 10 MG tablet, Take 0.5 tablets by mouth Every 6 (Six) Hours., Disp: 120 tablet, Rfl: 0  •  cefdinir (OMNICEF) 300 MG capsule, Take 1 capsule by mouth 2 (Two) Times a Day., Disp: 14 capsule, Rfl: 0  •  chlorthalidone (HYGROTON) 25 MG tablet, Take 25 mg by mouth Daily., Disp: , Rfl:   •  escitalopram (LEXAPRO) 10 MG tablet, Take 10 mg by mouth Every Night., Disp: , Rfl:   •  latanoprost (XALATAN) 0.005 % ophthalmic solution, Administer 1 drop to the right eye Every Night., Disp: , Rfl:   •  montelukast (SINGULAIR) 10 MG tablet, Take 10 mg by mouth Every Night., Disp: , Rfl:     Allergies   Allergen Reactions   • Liquid Bandage [New Skin] Hives        PHYSICAL EXAMINATION:  Vitals:    02/11/20 1418   BP: 124/83   Pulse: 93      Body mass index is 34.73 kg/m².       HEENT: Normal.    .   EXTREMITIES: No edema.     EPWORTH SLEEPINESS SCALE  Sitting and reading  0  WatchingTV  0  Sitting, inactive, in a public place  0  As a passenger in a car for 1 hour w/o a break  0  Lying down to rest in the afternoon  0  Sitting and talking to someone  0  Sitting quietly after a lunch  0  In a car, while stopped for traffic or a light  0  Total 0          IMPRESSION:     Patient with obstructive sleep apnea syndrome with hypersomnia successfully treated with  CPAP therapy and is compliant and benefiting from it.     Obesity,    RECOMMENDATIONS:   1. Continue present CPAP.   2. Follow up 1 year.   3  will review scr when available  4. Pt encouraged to lose weight      This document has been electronically signed by Joseph Seipel, MD on February 11, 2020 2:44 PM

## 2020-02-13 ENCOUNTER — TELEPHONE (OUTPATIENT)
Dept: NEUROLOGY | Facility: CLINIC | Age: 50
End: 2020-02-13

## 2020-02-13 DIAGNOSIS — G47.33 OBSTRUCTIVE SLEEP APNEA: Primary | ICD-10-CM

## 2020-06-09 ENCOUNTER — LAB REQUISITION (OUTPATIENT)
Dept: LAB | Facility: HOSPITAL | Age: 50
End: 2020-06-09

## 2020-06-09 DIAGNOSIS — N20.2 CALCULUS OF KIDNEY WITH CALCULUS OF URETER: ICD-10-CM

## 2020-06-09 PROCEDURE — 82365 CALCULUS SPECTROSCOPY: CPT | Performed by: UROLOGY

## 2020-06-19 LAB
COD CRY STONE QL IR: 30 %
COLOR STONE: NORMAL
COM CRY STONE QL IR: 70 %
COMPN STONE: NORMAL
LABORATORY COMMENT REPORT: NORMAL
Lab: NORMAL
Lab: NORMAL
PHOTO: NORMAL
SIZE STONE: NORMAL MM
SPECIMEN SOURCE: NORMAL
WT STONE: 73 MG

## 2021-02-11 ENCOUNTER — TELEMEDICINE (OUTPATIENT)
Dept: NEUROLOGY | Facility: CLINIC | Age: 51
End: 2021-02-11

## 2021-02-11 DIAGNOSIS — G47.33 OBSTRUCTIVE SLEEP APNEA: Primary | ICD-10-CM

## 2021-02-11 DIAGNOSIS — E66.9 OBESITY WITH BODY MASS INDEX 30 OR GREATER: ICD-10-CM

## 2021-02-11 PROCEDURE — 99213 OFFICE O/P EST LOW 20 MIN: CPT | Performed by: PSYCHIATRY & NEUROLOGY

## 2021-02-11 NOTE — PROGRESS NOTES
Telehealth    Video    You have chosen to receive care through a telehealth visit.  Do you consent to use a video/audio connection for your medical care today? Yes      Sleep medicine follow-up visit    C[place] Mosquera   1970  50 y.o. male   DATE OF SERVICE: 2/11/2021     JOSÉ MIGUEL Mcfarlane    obtained new machine, 6 months ago  The compliance data r will be reviewed when available.    She reports using CPAP on a nightly basis without difficulty with no significant problems with ear pressure or the mask.    EPWORTH SLEEPINESS SCALE  Sitting and reading 1 WatchingTV 1  Sitting, inactive, in a public place 0  As a passenger in a car for 1 hour w/o a break  0  Lying down to rest in the afternoon  1  Sitting and talking to someone  0  Sitting quietly after a lunch  0  In a car, while stopped for traffic or a light  N/A  Total 4  Lost 28 lbs, 231 lbs now    Obtained sleep number bed  HRV  Is 60    No sig problem with insomnia, takes melatonin at times.   ======history 2020 feb =======================  Yearly f/u for CPAP compliance, pt. doing well with cpap compliance. Pt. uses a full face mask and goes through [email or web address] for supplies and would like to go with [email or web address].   Sleeps every night all night. No significant air leaks. occ wakes with air leaks     Feels rested during day epworth 0     Obesity, unchanged      On NPSG at [facility] , 11/6/2012 patient had Moderate obstructive sleep apnea syndrome with apnea-hypopnea index of 24 per sleep hour, minimum SpO2 of 96.1%    ============================================================  Review of Systems   Constitutional: Positive for fatigue.   HENT: Negative for ear pain.    Respiratory: Negative for apnea.    Cardiovascular: Negative for chest pain.     I reviewed and addressed ROS entered by MA.        The following portions of the patient's history were reviewed and updated as appropriate: allergies, current medications, past family history, past medical history,  past social history, past surgical history and problem list.      Family History   Problem Relation Age of Onset   • Cancer Father    • Malig Hyperthermia Neg Hx        Past Medical History:   Diagnosis Date   • Anxiety    • Back pain    • History of shingles     2016   • Pupil dilation     LEFT EYE, DUE TO RETINA SURGERY   • Sleep apnea    • Stickler syndrome        Social History     Socioeconomic History   • Marital status:      Spouse name: Not on file   • Number of children: 0   • Years of education: DEVAUGHN   • Highest education level: Not on file   Occupational History   • Occupation: Consultant   Tobacco Use   • Smoking status: Never Smoker   • Smokeless tobacco: Never Used   Substance and Sexual Activity   • Alcohol use: Yes     Alcohol/week: 1.0 standard drinks     Types: 1 Cans of beer per week   • Drug use: No   • Sexual activity: Defer         Current Outpatient Medications:   •  baclofen (LIORESAL) 10 MG tablet, Take 0.5 tablets by mouth Every 6 (Six) Hours., Disp: 120 tablet, Rfl: 0  •  cefdinir (OMNICEF) 300 MG capsule, Take 1 capsule by mouth 2 (Two) Times a Day., Disp: 14 capsule, Rfl: 0  •  chlorthalidone (HYGROTON) 25 MG tablet, Take 25 mg by mouth Daily., Disp: , Rfl:   •  escitalopram (LEXAPRO) 10 MG tablet, Take 10 mg by mouth Every Night., Disp: , Rfl:   •  latanoprost (XALATAN) 0.005 % ophthalmic solution, Administer 1 drop to the right eye Every Night., Disp: , Rfl:   •  montelukast (SINGULAIR) 10 MG tablet, Take 10 mg by mouth Every Night., Disp: , Rfl:     Allergies   Allergen Reactions   • Liquid Bandage [New Skin] Hives        PHYSICAL EXAMINATION:  There were no vitals filed for this visit.   There is no height or weight on file to calculate BMI.       The patient was awake and alert in no distress.  Speech and language was normal    Diagnoses and all orders for this visit:    1. Obstructive sleep apnea (Primary)    2. Obesity with body mass index 30 or greater         Continue CPAP  at the current pressure.  We will review the smartcard report when available.    Patient is encouraged to continue his weight management efforts.  This document has been electronically signed by Joseph Seipel, MD on February 11, 2021 16:20 EST

## 2021-02-12 ENCOUNTER — TELEPHONE (OUTPATIENT)
Dept: NEUROLOGY | Facility: CLINIC | Age: 51
End: 2021-02-12

## 2021-02-12 DIAGNOSIS — G47.33 OBSTRUCTIVE SLEEP APNEA: Primary | ICD-10-CM

## 2022-03-15 ENCOUNTER — TRANSCRIBE ORDERS (OUTPATIENT)
Dept: ADMINISTRATIVE | Facility: HOSPITAL | Age: 52
End: 2022-03-15

## 2022-03-15 ENCOUNTER — LAB (OUTPATIENT)
Dept: LAB | Facility: HOSPITAL | Age: 52
End: 2022-03-15

## 2022-03-15 DIAGNOSIS — I10 ESSENTIAL HYPERTENSION, MALIGNANT: ICD-10-CM

## 2022-03-15 DIAGNOSIS — Z12.5 SPECIAL SCREENING, PROSTATE CANCER: ICD-10-CM

## 2022-03-15 DIAGNOSIS — Z12.5 SPECIAL SCREENING, PROSTATE CANCER: Primary | ICD-10-CM

## 2022-03-15 DIAGNOSIS — R73.01 ELEVATED FASTING GLUCOSE: ICD-10-CM

## 2022-03-15 LAB
ALBUMIN UR-MCNC: <1.2 MG/DL
ANION GAP SERPL CALCULATED.3IONS-SCNC: 12.1 MMOL/L (ref 5–15)
BUN SERPL-MCNC: 16 MG/DL (ref 6–20)
BUN/CREAT SERPL: 16.7 (ref 7–25)
CALCIUM SPEC-SCNC: 9.9 MG/DL (ref 8.6–10.5)
CHLORIDE SERPL-SCNC: 99 MMOL/L (ref 98–107)
CHOLEST SERPL-MCNC: 199 MG/DL (ref 0–200)
CO2 SERPL-SCNC: 28.9 MMOL/L (ref 22–29)
CREAT SERPL-MCNC: 0.96 MG/DL (ref 0.76–1.27)
EGFRCR SERPLBLD CKD-EPI 2021: 94.5 ML/MIN/1.73
GLUCOSE SERPL-MCNC: 102 MG/DL (ref 65–99)
HBA1C MFR BLD: 5.2 % (ref 3.5–5.6)
HDLC SERPL-MCNC: 50 MG/DL (ref 40–60)
LDLC SERPL CALC-MCNC: 114 MG/DL (ref 0–100)
LDLC/HDLC SERPL: 2.18 {RATIO}
POTASSIUM SERPL-SCNC: 4.3 MMOL/L (ref 3.5–5.2)
PSA SERPL-MCNC: 1.13 NG/ML (ref 0–4)
SODIUM SERPL-SCNC: 140 MMOL/L (ref 136–145)
TRIGL SERPL-MCNC: 201 MG/DL (ref 0–150)
VLDLC SERPL-MCNC: 35 MG/DL (ref 5–40)

## 2022-03-15 PROCEDURE — G0103 PSA SCREENING: HCPCS

## 2022-03-15 PROCEDURE — 80048 BASIC METABOLIC PNL TOTAL CA: CPT

## 2022-03-15 PROCEDURE — 82043 UR ALBUMIN QUANTITATIVE: CPT

## 2022-03-15 PROCEDURE — 83036 HEMOGLOBIN GLYCOSYLATED A1C: CPT

## 2022-03-15 PROCEDURE — 36415 COLL VENOUS BLD VENIPUNCTURE: CPT

## 2022-03-15 PROCEDURE — 80061 LIPID PANEL: CPT

## 2023-07-21 ENCOUNTER — TELEPHONE (OUTPATIENT)
Dept: NEUROLOGY | Facility: CLINIC | Age: 53
End: 2023-07-21
Payer: COMMERCIAL

## 2024-01-23 ENCOUNTER — TRANSCRIBE ORDERS (OUTPATIENT)
Dept: ADMINISTRATIVE | Facility: HOSPITAL | Age: 54
End: 2024-01-23
Payer: COMMERCIAL

## 2024-01-23 ENCOUNTER — LAB (OUTPATIENT)
Dept: LAB | Facility: HOSPITAL | Age: 54
End: 2024-01-23
Payer: COMMERCIAL

## 2024-01-23 DIAGNOSIS — R73.01 IMPAIRED FASTING GLUCOSE: Primary | ICD-10-CM

## 2024-01-23 DIAGNOSIS — R73.01 IMPAIRED FASTING GLUCOSE: ICD-10-CM

## 2024-01-23 DIAGNOSIS — I10 ESSENTIAL HYPERTENSION, MALIGNANT: ICD-10-CM

## 2024-01-23 DIAGNOSIS — Z12.5 SPECIAL SCREENING FOR MALIGNANT NEOPLASM OF PROSTATE: ICD-10-CM

## 2024-01-23 LAB
ALBUMIN UR-MCNC: <1.2 MG/DL
CHOLEST SERPL-MCNC: 184 MG/DL (ref 0–200)
CREAT UR-MCNC: 14.2 MG/DL
HBA1C MFR BLD: 5.1 % (ref 4.8–5.6)
HDLC SERPL-MCNC: 45 MG/DL (ref 40–60)
LDLC SERPL CALC-MCNC: 113 MG/DL (ref 0–100)
LDLC/HDLC SERPL: 2.43 {RATIO}
MAGNESIUM SERPL-MCNC: 1.9 MG/DL (ref 1.6–2.6)
MICROALBUMIN/CREAT UR: NORMAL MG/G{CREAT}
PSA SERPL-MCNC: 1.24 NG/ML (ref 0–4)
TRIGL SERPL-MCNC: 148 MG/DL (ref 0–150)
VLDLC SERPL-MCNC: 26 MG/DL (ref 5–40)

## 2024-01-23 PROCEDURE — 83735 ASSAY OF MAGNESIUM: CPT

## 2024-01-23 PROCEDURE — 80061 LIPID PANEL: CPT

## 2024-01-23 PROCEDURE — 83036 HEMOGLOBIN GLYCOSYLATED A1C: CPT

## 2024-01-23 PROCEDURE — G0103 PSA SCREENING: HCPCS

## 2024-01-23 PROCEDURE — 82570 ASSAY OF URINE CREATININE: CPT

## 2024-01-23 PROCEDURE — 82043 UR ALBUMIN QUANTITATIVE: CPT

## 2024-01-23 PROCEDURE — 36415 COLL VENOUS BLD VENIPUNCTURE: CPT

## 2025-02-27 ENCOUNTER — TRANSCRIBE ORDERS (OUTPATIENT)
Dept: ADMINISTRATIVE | Facility: HOSPITAL | Age: 55
End: 2025-02-27
Payer: COMMERCIAL

## 2025-02-27 ENCOUNTER — LAB (OUTPATIENT)
Dept: LAB | Facility: HOSPITAL | Age: 55
End: 2025-02-27
Payer: COMMERCIAL

## 2025-02-27 DIAGNOSIS — Z12.5 SPECIAL SCREENING FOR MALIGNANT NEOPLASM OF PROSTATE: ICD-10-CM

## 2025-02-27 DIAGNOSIS — I10 ESSENTIAL HYPERTENSION, MALIGNANT: Primary | ICD-10-CM

## 2025-02-27 DIAGNOSIS — R73.01 IMPAIRED FASTING GLUCOSE: ICD-10-CM

## 2025-02-27 DIAGNOSIS — I10 ESSENTIAL HYPERTENSION, MALIGNANT: ICD-10-CM

## 2025-02-27 LAB
ALBUMIN SERPL-MCNC: 4.6 G/DL (ref 3.5–5.2)
ALBUMIN UR-MCNC: <1.2 MG/DL
ALBUMIN/GLOB SERPL: 1.3 G/DL
ALP SERPL-CCNC: 50 U/L (ref 39–117)
ALT SERPL W P-5'-P-CCNC: 34 U/L (ref 1–41)
ANION GAP SERPL CALCULATED.3IONS-SCNC: 11.6 MMOL/L (ref 5–15)
AST SERPL-CCNC: 28 U/L (ref 1–40)
BASOPHILS # BLD AUTO: 0.02 10*3/MM3 (ref 0–0.2)
BASOPHILS NFR BLD AUTO: 0.3 % (ref 0–1.5)
BILIRUB SERPL-MCNC: 0.7 MG/DL (ref 0–1.2)
BUN SERPL-MCNC: 21 MG/DL (ref 6–20)
BUN/CREAT SERPL: 20.2 (ref 7–25)
CALCIUM SPEC-SCNC: 10.3 MG/DL (ref 8.6–10.5)
CHLORIDE SERPL-SCNC: 96 MMOL/L (ref 98–107)
CHOLEST SERPL-MCNC: 223 MG/DL (ref 0–200)
CO2 SERPL-SCNC: 30.4 MMOL/L (ref 22–29)
CREAT SERPL-MCNC: 1.04 MG/DL (ref 0.76–1.27)
CREAT UR-MCNC: 14.5 MG/DL
DEPRECATED RDW RBC AUTO: 42.4 FL (ref 37–54)
EGFRCR SERPLBLD CKD-EPI 2021: 84.8 ML/MIN/1.73
EOSINOPHIL # BLD AUTO: 0.14 10*3/MM3 (ref 0–0.4)
EOSINOPHIL NFR BLD AUTO: 2.2 % (ref 0.3–6.2)
ERYTHROCYTE [DISTWIDTH] IN BLOOD BY AUTOMATED COUNT: 12.2 % (ref 12.3–15.4)
GLOBULIN UR ELPH-MCNC: 3.5 GM/DL
GLUCOSE SERPL-MCNC: 99 MG/DL (ref 65–99)
HBA1C MFR BLD: 5.41 % (ref 4.8–5.6)
HCT VFR BLD AUTO: 49.4 % (ref 37.5–51)
HDLC SERPL-MCNC: 51 MG/DL (ref 40–60)
HGB BLD-MCNC: 16 G/DL (ref 13–17.7)
IMM GRANULOCYTES # BLD AUTO: 0.02 10*3/MM3 (ref 0–0.05)
IMM GRANULOCYTES NFR BLD AUTO: 0.3 % (ref 0–0.5)
LDLC SERPL CALC-MCNC: 147 MG/DL (ref 0–100)
LDLC/HDLC SERPL: 2.82 {RATIO}
LYMPHOCYTES # BLD AUTO: 1.99 10*3/MM3 (ref 0.7–3.1)
LYMPHOCYTES NFR BLD AUTO: 30.7 % (ref 19.6–45.3)
MCH RBC QN AUTO: 30.3 PG (ref 26.6–33)
MCHC RBC AUTO-ENTMCNC: 32.4 G/DL (ref 31.5–35.7)
MCV RBC AUTO: 93.6 FL (ref 79–97)
MICROALBUMIN/CREAT UR: NORMAL MG/G{CREAT}
MONOCYTES # BLD AUTO: 0.45 10*3/MM3 (ref 0.1–0.9)
MONOCYTES NFR BLD AUTO: 6.9 % (ref 5–12)
NEUTROPHILS NFR BLD AUTO: 3.86 10*3/MM3 (ref 1.7–7)
NEUTROPHILS NFR BLD AUTO: 59.6 % (ref 42.7–76)
NRBC BLD AUTO-RTO: 0 /100 WBC (ref 0–0.2)
PLATELET # BLD AUTO: 270 10*3/MM3 (ref 140–450)
PMV BLD AUTO: 10.1 FL (ref 6–12)
POTASSIUM SERPL-SCNC: 3.7 MMOL/L (ref 3.5–5.2)
PROT SERPL-MCNC: 8.1 G/DL (ref 6–8.5)
PSA SERPL-MCNC: 1.1 NG/ML (ref 0–4)
RBC # BLD AUTO: 5.28 10*6/MM3 (ref 4.14–5.8)
SODIUM SERPL-SCNC: 138 MMOL/L (ref 136–145)
TRIGL SERPL-MCNC: 141 MG/DL (ref 0–150)
VLDLC SERPL-MCNC: 25 MG/DL (ref 5–40)
WBC NRBC COR # BLD AUTO: 6.48 10*3/MM3 (ref 3.4–10.8)

## 2025-02-27 PROCEDURE — 36415 COLL VENOUS BLD VENIPUNCTURE: CPT

## 2025-02-27 PROCEDURE — 83036 HEMOGLOBIN GLYCOSYLATED A1C: CPT

## 2025-02-27 PROCEDURE — 80053 COMPREHEN METABOLIC PANEL: CPT

## 2025-02-27 PROCEDURE — 85025 COMPLETE CBC W/AUTO DIFF WBC: CPT

## 2025-02-27 PROCEDURE — 80061 LIPID PANEL: CPT

## 2025-02-27 PROCEDURE — 82570 ASSAY OF URINE CREATININE: CPT

## 2025-02-27 PROCEDURE — 82043 UR ALBUMIN QUANTITATIVE: CPT

## 2025-02-27 PROCEDURE — G0103 PSA SCREENING: HCPCS

## (undated) DEVICE — OIL CARTRIDGE: Brand: CORE, MAESTRO

## (undated) DEVICE — SUT SILK 2/0 FS BLK 18IN 685G

## (undated) DEVICE — DRP MICROSCP LEICA W/GLASS LENS

## (undated) DEVICE — GLV SURG BIOGEL LTX PF 7

## (undated) DEVICE — Device

## (undated) DEVICE — CONN TBG Y 5 IN 1 LF STRL

## (undated) DEVICE — FLOSEAL MATRIX IS INDICATED IN SURGICAL PROCEDURES (OTHER THAN IN OPHTHALMIC) AS AN ADJUNCT TO HEMOSTASIS WHEN CONTROL OF BLEEDING BY LIGATURE OR CONVENTIONAL PROCEDURES IS INEFFECTIVE OR IMPRACTICAL.: Brand: FLOSEAL HEMOSTATIC MATRIX

## (undated) DEVICE — DIFFUSER: Brand: CORE, MAESTRO

## (undated) DEVICE — SYR CONTRL LUERLOK 10CC

## (undated) DEVICE — CODMAN® SURGICAL PATTIES 3/4" X 3/4" (1.91CM X 1.91CM): Brand: CODMAN®

## (undated) DEVICE — 6.0MM PRECISION ROUND

## (undated) DEVICE — ADHS SKIN DERMABOND TOP ADVANCED

## (undated) DEVICE — SHEET, DRAPE, SPLIT, STERILE: Brand: MEDLINE

## (undated) DEVICE — NDL SPINE 20G 3 1/2 YEL STRL 1P/U

## (undated) DEVICE — UNDYED BRAIDED (POLYGLACTIN 910), SYNTHETIC ABSORBABLE SUTURE: Brand: COATED VICRYL

## (undated) DEVICE — SOL NACL 0.9PCT 100ML SGL

## (undated) DEVICE — SMOKE EVACUATION TUBING WITH 7/8 IN TO 1/4 IN REDUCER: Brand: BUFFALO FILTER

## (undated) DEVICE — 3M™ STERI-STRIP™ REINFORCED ADHESIVE SKIN CLOSURES, R1546, 1/4 IN X 4 IN (6 MM X 100 MM), 10 STRIPS/ENVELOPE: Brand: 3M™ STERI-STRIP™

## (undated) DEVICE — SPNG GZ WOVN 4X4IN 12PLY 10/BX STRL

## (undated) DEVICE — GLV SURG SENSICARE W/ALOE PF LF 7.5 STRL

## (undated) DEVICE — PK NEURO SPINE 40

## (undated) DEVICE — APPL CHLORAPREP W/TINT 26ML ORNG